# Patient Record
Sex: MALE | Race: WHITE | NOT HISPANIC OR LATINO | Employment: FULL TIME | URBAN - METROPOLITAN AREA
[De-identification: names, ages, dates, MRNs, and addresses within clinical notes are randomized per-mention and may not be internally consistent; named-entity substitution may affect disease eponyms.]

---

## 2017-11-10 ENCOUNTER — OFFICE VISIT (OUTPATIENT)
Dept: SURGERY | Facility: CLINIC | Age: 42
End: 2017-11-10
Payer: COMMERCIAL

## 2017-11-10 VITALS
SYSTOLIC BLOOD PRESSURE: 122 MMHG | BODY MASS INDEX: 22.76 KG/M2 | WEIGHT: 145 LBS | HEIGHT: 67 IN | DIASTOLIC BLOOD PRESSURE: 79 MMHG

## 2017-11-10 DIAGNOSIS — D17.1 LIPOMA OF BACK: Primary | ICD-10-CM

## 2017-11-10 PROCEDURE — 99243 OFF/OP CNSLTJ NEW/EST LOW 30: CPT | Mod: ,,, | Performed by: SURGERY

## 2017-11-10 NOTE — PROGRESS NOTES
Subjective:       Patient ID: Dionisio Devlin is a 41 y.o. male.    Chief Complaint: Other (Referred by  to eval mass on right side of back)      HPI:  Patient presents for evaluation of enlarging mass on his back. Minimal local symptoms only. Patient had a CT scan of the chest which showed that the mass has enlarged compared to previous CTs.    Past Medical History:   Diagnosis Date    Cyst of prostate     Enlarged prostate      History reviewed. No pertinent surgical history.  Review of patient's allergies indicates:  No Known Allergies    Family History   Problem Relation Age of Onset    Lung cancer Mother     Skin cancer Father      Social History     Social History    Marital status:      Spouse name: N/A    Number of children: N/A    Years of education: N/A     Social History Main Topics    Smoking status: Never Smoker    Smokeless tobacco: Never Used    Alcohol use No    Drug use: No    Sexual activity: Not Asked     Other Topics Concern    None     Social History Narrative    None         Review of Systems   Constitutional: Negative for appetite change, chills, fever and unexpected weight change.   HENT: Negative for hearing loss, rhinorrhea, sore throat and voice change.    Eyes: Negative for photophobia and visual disturbance.   Respiratory: Negative for cough, choking and shortness of breath.    Cardiovascular: Negative for chest pain, palpitations and leg swelling.   Gastrointestinal: Negative for abdominal pain, blood in stool, constipation, diarrhea, nausea and vomiting.   Endocrine: Negative for cold intolerance, heat intolerance, polydipsia and polyuria.   Genitourinary: Positive for flank pain.   Musculoskeletal: Negative for arthralgias, back pain, joint swelling and neck stiffness.   Skin: Negative for color change, pallor and rash.   Neurological: Negative for dizziness, seizures, syncope and headaches.   Hematological: Negative for adenopathy. Does not bruise/bleed  easily.   Psychiatric/Behavioral: Negative for agitation, behavioral problems and confusion.       Objective:      Physical Exam   Constitutional: He appears well-developed and well-nourished.  Non-toxic appearance. No distress.   HENT:   Head: Normocephalic and atraumatic. Head is without abrasion and without laceration.   Right Ear: External ear normal.   Left Ear: External ear normal.   Nose: Nose normal.   Mouth/Throat: Oropharynx is clear and moist.   Eyes: EOM are normal. Pupils are equal, round, and reactive to light.   Neck: Trachea normal. No tracheal deviation and normal range of motion present. No thyroid mass and no thyromegaly present.   Cardiovascular: Normal rate and regular rhythm.    Pulmonary/Chest: Effort normal. No accessory muscle usage. No tachypnea. No respiratory distress.   Mobile nontender soft mass on right lateral posterior chest wall. Feels like a lipoma. Feels fairly superficial although the CT describes it located inside latissimus dorsi muscle.   Abdominal: Soft. Normal appearance and bowel sounds are normal. He exhibits no distension and no mass. There is no hepatosplenomegaly. There is no tenderness. There is no tenderness at McBurney's point and negative Fernandez's sign. No hernia.   Lymphadenopathy:     He has no cervical adenopathy.     He has no axillary adenopathy.        Right: No inguinal adenopathy present.        Left: No inguinal adenopathy present.   Neurological: He is alert. Coordination and gait normal.   Skin: Skin is warm and intact.   Psychiatric: He has a normal mood and affect. His speech is normal and behavior is normal.       Assessment/Plan:   Lipoma of back        The mass has been enlarging over the period of years. Recommend removal. Discussed the procedure with patient at length. Patient was think about it and let me know

## 2017-11-10 NOTE — LETTER
November 14, 2017      Benito Villasenor III, MD  [address]  [place] [address]           [facility]  [address]  [address]  [facility] [address]  Phone: [phone]  Fax: [phone]          Patient: Dionisio Devlin   MR Number: [id number]   YOB: 1975   Date of Visit: 11/10/2017       Dear Dr. Benito Villasenor III:    Thank you for referring Dionisio Devlin to me for evaluation. Attached you will find relevant portions of my assessment and plan of care.    If you have questions, please do not hesitate to call me. I look forward to following Dionisio Devlin along with you.    Sincerely,    Serjio MCCLOUD MD    Enclosure  CC:  No Recipients    If you would like to receive this communication electronically, please contact [email or web address] or [phone] to request more information on [employer] access.    For providers and/or their staff who would like to refer a patient to [facility], please contact us through our one-stop-shop provider referral line, [facility], at [phone].    If you feel you have received this communication in error or would no longer like to receive these types of communications, please e-mail [email or web address]

## 2020-06-29 ENCOUNTER — OCCUPATIONAL HEALTH (OUTPATIENT)
Dept: URGENT CARE | Facility: CLINIC | Age: 45
End: 2020-06-29
Payer: COMMERCIAL

## 2020-06-29 DIAGNOSIS — Z02.83 ENCOUNTER FOR DRUG SCREENING: ICD-10-CM

## 2020-06-29 DIAGNOSIS — Z02.1 PRE-EMPLOYMENT EXAMINATION: Primary | ICD-10-CM

## 2020-06-29 LAB — BREATH ALCOHOL: 0

## 2020-06-29 PROCEDURE — 99499 UNLISTED E&M SERVICE: CPT | Mod: S$GLB,,, | Performed by: PHYSICIAN ASSISTANT

## 2020-06-29 PROCEDURE — 82075 POCT BREATH ALCOHOL TEST: ICD-10-PCS | Mod: S$GLB,,, | Performed by: PHYSICIAN ASSISTANT

## 2020-06-29 PROCEDURE — 80305 DRUG TEST PRSMV DIR OPT OBS: CPT | Mod: S$GLB,,, | Performed by: PHYSICIAN ASSISTANT

## 2020-06-29 PROCEDURE — 82075 ASSAY OF BREATH ETHANOL: CPT | Mod: S$GLB,,, | Performed by: PHYSICIAN ASSISTANT

## 2020-06-29 PROCEDURE — 80305 OOH COLLECTION ONLY DRUG SCREEN: ICD-10-PCS | Mod: S$GLB,,, | Performed by: PHYSICIAN ASSISTANT

## 2020-06-29 PROCEDURE — 99499 PHYSICAL, BASIC COMPLEXITY: ICD-10-PCS | Mod: S$GLB,,, | Performed by: PHYSICIAN ASSISTANT

## 2020-07-08 ENCOUNTER — LAB VISIT (OUTPATIENT)
Dept: PRIMARY CARE CLINIC | Facility: OTHER | Age: 45
End: 2020-07-08
Attending: INTERNAL MEDICINE
Payer: OTHER GOVERNMENT

## 2020-07-08 DIAGNOSIS — Z11.59 SPECIAL SCREENING EXAMINATION FOR UNSPECIFIED VIRAL DISEASE: Primary | ICD-10-CM

## 2020-07-08 PROCEDURE — U0003 INFECTIOUS AGENT DETECTION BY NUCLEIC ACID (DNA OR RNA); SEVERE ACUTE RESPIRATORY SYNDROME CORONAVIRUS 2 (SARS-COV-2) (CORONAVIRUS DISEASE [COVID-19]), AMPLIFIED PROBE TECHNIQUE, MAKING USE OF HIGH THROUGHPUT TECHNOLOGIES AS DESCRIBED BY CMS-2020-01-R: HCPCS | Mod: ST72

## 2020-07-12 LAB — SARS-COV-2 RNA RESP QL NAA+PROBE: NEGATIVE

## 2021-06-22 ENCOUNTER — LAB VISIT (OUTPATIENT)
Dept: LAB | Facility: HOSPITAL | Age: 46
End: 2021-06-22
Attending: SPECIALIST
Payer: COMMERCIAL

## 2021-06-22 DIAGNOSIS — Z12.5 SCREENING FOR PROSTATE CANCER: Primary | ICD-10-CM

## 2021-06-22 LAB — COMPLEXED PSA SERPL-MCNC: 0.36 NG/ML (ref 0–4)

## 2021-06-22 PROCEDURE — 84153 ASSAY OF PSA TOTAL: CPT | Performed by: SPECIALIST

## 2021-06-22 PROCEDURE — 36415 COLL VENOUS BLD VENIPUNCTURE: CPT | Performed by: SPECIALIST

## 2022-06-22 ENCOUNTER — LAB VISIT (OUTPATIENT)
Dept: LAB | Facility: HOSPITAL | Age: 47
End: 2022-06-22
Attending: SPECIALIST
Payer: COMMERCIAL

## 2022-06-22 DIAGNOSIS — Z12.5 SPECIAL SCREENING FOR MALIGNANT NEOPLASM OF PROSTATE: Primary | ICD-10-CM

## 2022-06-22 LAB — COMPLEXED PSA SERPL-MCNC: 0.36 NG/ML (ref 0–4)

## 2022-06-22 PROCEDURE — 84153 ASSAY OF PSA TOTAL: CPT | Performed by: SPECIALIST

## 2022-06-22 PROCEDURE — 36415 COLL VENOUS BLD VENIPUNCTURE: CPT | Performed by: SPECIALIST

## 2022-07-18 ENCOUNTER — CLINICAL SUPPORT (OUTPATIENT)
Dept: CARDIOLOGY | Facility: HOSPITAL | Age: 47
End: 2022-07-18
Payer: COMMERCIAL

## 2022-07-18 ENCOUNTER — HOSPITAL ENCOUNTER (OUTPATIENT)
Facility: HOSPITAL | Age: 47
Discharge: HOME | End: 2022-07-20
Attending: EMERGENCY MEDICINE | Admitting: INTERNAL MEDICINE
Payer: COMMERCIAL

## 2022-07-18 VITALS — WEIGHT: 160 LBS | HEIGHT: 66 IN | BODY MASS INDEX: 25.71 KG/M2

## 2022-07-18 DIAGNOSIS — G45.9 TIA (TRANSIENT ISCHEMIC ATTACK): ICD-10-CM

## 2022-07-18 DIAGNOSIS — R20.0 NUMBNESS: ICD-10-CM

## 2022-07-18 DIAGNOSIS — R20.0 NUMBNESS: Primary | ICD-10-CM

## 2022-07-18 PROBLEM — N40.0 BPH (BENIGN PROSTATIC HYPERPLASIA): Status: ACTIVE | Noted: 2022-07-18

## 2022-07-18 LAB
ALBUMIN SERPL BCP-MCNC: 4.5 G/DL (ref 3.5–5.2)
ALP SERPL-CCNC: 56 U/L (ref 55–135)
ALT SERPL W/O P-5'-P-CCNC: 28 U/L (ref 10–44)
ANION GAP SERPL CALC-SCNC: 6 MMOL/L (ref 8–16)
AORTIC ROOT ANNULUS: 2.72 CM
AST SERPL-CCNC: 28 U/L (ref 10–40)
AV INDEX (PROSTH): 0.88
AV MEAN GRADIENT: 2 MMHG
AV VALVE AREA: 2.95 CM2
BASOPHILS # BLD AUTO: 0.03 K/UL (ref 0–0.2)
BASOPHILS NFR BLD: 0.7 % (ref 0–1.9)
BILIRUB SERPL-MCNC: 0.8 MG/DL (ref 0.1–1)
BILIRUB UR QL STRIP: NEGATIVE
BSA FOR ECHO PROCEDURE: 1.84 M2
BUN SERPL-MCNC: 13 MG/DL (ref 6–20)
CALCIUM SERPL-MCNC: 9 MG/DL (ref 8.7–10.5)
CHLORIDE SERPL-SCNC: 101 MMOL/L (ref 95–110)
CHOLEST SERPL-MCNC: 180 MG/DL (ref 120–199)
CHOLEST/HDLC SERPL: 3.2 {RATIO} (ref 2–5)
CLARITY UR: ABNORMAL
CO2 SERPL-SCNC: 30 MMOL/L (ref 23–29)
COLOR UR: YELLOW
CREAT SERPL-MCNC: 0.8 MG/DL (ref 0.5–1.4)
CREAT SERPL-MCNC: 0.9 MG/DL (ref 0.5–1.4)
CV ECHO LV RWT: 0.44 CM
DIFFERENTIAL METHOD: NORMAL
DOP CALC AO VTI: 22.18 CM
DOP CALC LVOT AREA: 3.3 CM2
DOP CALC LVOT DIAMETER: 2.06 CM
DOP CALC LVOT PEAK VEL: 86.83 M/S
DOP CALC LVOT STROKE VOLUME: 65.33 CM3
DOP CALCLVOT PEAK VEL VTI: 19.61 CM
E WAVE DECELERATION TIME: 186.47 MSEC
E/A RATIO: 1.31
E/E' RATIO: 4.74 M/S
ECHO LV POSTERIOR WALL: 0.97 CM (ref 0.6–1.1)
EJECTION FRACTION: 55 %
EOSINOPHIL # BLD AUTO: 0.1 K/UL (ref 0–0.5)
EOSINOPHIL NFR BLD: 2.6 % (ref 0–8)
ERYTHROCYTE [DISTWIDTH] IN BLOOD BY AUTOMATED COUNT: 12.9 % (ref 11.5–14.5)
EST. GFR  (AFRICAN AMERICAN): >60 ML/MIN/1.73 M^2
EST. GFR  (NON AFRICAN AMERICAN): >60 ML/MIN/1.73 M^2
FRACTIONAL SHORTENING: 23 % (ref 28–44)
GLUCOSE SERPL-MCNC: 121 MG/DL (ref 70–110)
GLUCOSE SERPL-MCNC: 136 MG/DL (ref 70–110)
GLUCOSE UR QL STRIP: NEGATIVE
HCT VFR BLD AUTO: 43 % (ref 40–54)
HDLC SERPL-MCNC: 56 MG/DL (ref 40–75)
HDLC SERPL: 31.1 % (ref 20–50)
HGB BLD-MCNC: 14.3 G/DL (ref 14–18)
HGB UR QL STRIP: NEGATIVE
IMM GRANULOCYTES # BLD AUTO: 0 K/UL (ref 0–0.04)
IMM GRANULOCYTES NFR BLD AUTO: 0 % (ref 0–0.5)
INR PPP: 1.1
INTERVENTRICULAR SEPTUM: 0.81 CM (ref 0.6–1.1)
KETONES UR QL STRIP: NEGATIVE
LDLC SERPL CALC-MCNC: 107.6 MG/DL (ref 63–159)
LEFT ATRIUM SIZE: 3.07 CM
LEFT INTERNAL DIMENSION IN SYSTOLE: 3.41 CM (ref 2.1–4)
LEFT VENTRICLE DIASTOLIC VOLUME INDEX: 49.27 ML/M2
LEFT VENTRICLE DIASTOLIC VOLUME: 89.67 ML
LEFT VENTRICLE MASS INDEX: 71 G/M2
LEFT VENTRICLE SYSTOLIC VOLUME INDEX: 16 ML/M2
LEFT VENTRICLE SYSTOLIC VOLUME: 29.2 ML
LEFT VENTRICULAR INTERNAL DIMENSION IN DIASTOLE: 4.45 CM (ref 3.5–6)
LEFT VENTRICULAR MASS: 128.46 G
LEUKOCYTE ESTERASE UR QL STRIP: NEGATIVE
LV LATERAL E/E' RATIO: 4.57 M/S
LV SEPTAL E/E' RATIO: 4.92 M/S
LYMPHOCYTES # BLD AUTO: 1.9 K/UL (ref 1–4.8)
LYMPHOCYTES NFR BLD: 40.3 % (ref 18–48)
MCH RBC QN AUTO: 30.5 PG (ref 27–31)
MCHC RBC AUTO-ENTMCNC: 33.3 G/DL (ref 32–36)
MCV RBC AUTO: 92 FL (ref 82–98)
MONOCYTES # BLD AUTO: 0.5 K/UL (ref 0.3–1)
MONOCYTES NFR BLD: 10.2 % (ref 4–15)
MV PEAK A VEL: 0.49 M/S
MV PEAK E VEL: 0.64 M/S
NEUTROPHILS # BLD AUTO: 2.1 K/UL (ref 1.8–7.7)
NEUTROPHILS NFR BLD: 46.2 % (ref 38–73)
NITRITE UR QL STRIP: NEGATIVE
NONHDLC SERPL-MCNC: 124 MG/DL
NRBC BLD-RTO: 0 /100 WBC
PH UR STRIP: 8 [PH] (ref 5–8)
PISA MRMAX VEL: 547.56 M/S
PISA TR MAX VEL: 1.98 M/S
PLATELET # BLD AUTO: 227 K/UL (ref 150–450)
PMV BLD AUTO: 9.8 FL (ref 9.2–12.9)
POTASSIUM SERPL-SCNC: 3.7 MMOL/L (ref 3.5–5.1)
PROT SERPL-MCNC: 7 G/DL (ref 6–8.4)
PROT UR QL STRIP: NEGATIVE
PROTHROMBIN TIME: 13.8 SEC (ref 11.4–13.7)
RA PRESSURE: 3 MMHG
RBC # BLD AUTO: 4.69 M/UL (ref 4.6–6.2)
SAMPLE: NORMAL
SARS-COV-2 RDRP RESP QL NAA+PROBE: NEGATIVE
SODIUM SERPL-SCNC: 137 MMOL/L (ref 136–145)
SP GR UR STRIP: 1.01 (ref 1–1.03)
TDI LATERAL: 0.14 M/S
TDI SEPTAL: 0.13 M/S
TDI: 0.14 M/S
TR MAX PG: 16 MMHG
TRICUSPID ANNULAR PLANE SYSTOLIC EXCURSION: 2 CM
TRIGL SERPL-MCNC: 82 MG/DL (ref 30–150)
TSH SERPL DL<=0.005 MIU/L-ACNC: 4.49 UIU/ML (ref 0.34–5.6)
TV REST PULMONARY ARTERY PRESSURE: 19 MMHG
URN SPEC COLLECT METH UR: ABNORMAL
UROBILINOGEN UR STRIP-ACNC: NEGATIVE EU/DL
WBC # BLD AUTO: 4.59 K/UL (ref 3.9–12.7)

## 2022-07-18 PROCEDURE — 93306 ECHO (CUPID ONLY): ICD-10-PCS | Mod: 26,,, | Performed by: SPECIALIST

## 2022-07-18 PROCEDURE — 93005 ELECTROCARDIOGRAM TRACING: CPT | Performed by: SPECIALIST

## 2022-07-18 PROCEDURE — 93010 ELECTROCARDIOGRAM REPORT: CPT | Mod: ,,, | Performed by: SPECIALIST

## 2022-07-18 PROCEDURE — 99285 EMERGENCY DEPT VISIT HI MDM: CPT | Mod: 25

## 2022-07-18 PROCEDURE — 93010 EKG 12-LEAD: ICD-10-PCS | Mod: ,,, | Performed by: SPECIALIST

## 2022-07-18 PROCEDURE — 85025 COMPLETE CBC W/AUTO DIFF WBC: CPT | Performed by: EMERGENCY MEDICINE

## 2022-07-18 PROCEDURE — 84443 ASSAY THYROID STIM HORMONE: CPT | Performed by: EMERGENCY MEDICINE

## 2022-07-18 PROCEDURE — 85610 PROTHROMBIN TIME: CPT | Performed by: EMERGENCY MEDICINE

## 2022-07-18 PROCEDURE — 80053 COMPREHEN METABOLIC PANEL: CPT | Performed by: EMERGENCY MEDICINE

## 2022-07-18 PROCEDURE — 82962 GLUCOSE BLOOD TEST: CPT

## 2022-07-18 PROCEDURE — G0378 HOSPITAL OBSERVATION PER HR: HCPCS

## 2022-07-18 PROCEDURE — 81003 URINALYSIS AUTO W/O SCOPE: CPT | Performed by: NURSE PRACTITIONER

## 2022-07-18 PROCEDURE — 93306 TTE W/DOPPLER COMPLETE: CPT | Mod: 26,,, | Performed by: SPECIALIST

## 2022-07-18 PROCEDURE — 93306 TTE W/DOPPLER COMPLETE: CPT

## 2022-07-18 PROCEDURE — 80061 LIPID PANEL: CPT | Performed by: EMERGENCY MEDICINE

## 2022-07-18 PROCEDURE — U0002 COVID-19 LAB TEST NON-CDC: HCPCS | Performed by: EMERGENCY MEDICINE

## 2022-07-18 RX ORDER — MAGNESIUM SULFATE HEPTAHYDRATE 40 MG/ML
4 INJECTION, SOLUTION INTRAVENOUS
Status: DISCONTINUED | OUTPATIENT
Start: 2022-07-18 | End: 2022-07-20 | Stop reason: HOSPADM

## 2022-07-18 RX ORDER — MAGNESIUM SULFATE HEPTAHYDRATE 40 MG/ML
2 INJECTION, SOLUTION INTRAVENOUS
Status: DISCONTINUED | OUTPATIENT
Start: 2022-07-18 | End: 2022-07-20 | Stop reason: HOSPADM

## 2022-07-18 RX ORDER — TAMSULOSIN HYDROCHLORIDE 0.4 MG/1
0.4 CAPSULE ORAL DAILY
Status: DISCONTINUED | OUTPATIENT
Start: 2022-07-18 | End: 2022-07-20 | Stop reason: HOSPADM

## 2022-07-18 RX ORDER — MAGNESIUM SULFATE 1 G/100ML
1 INJECTION INTRAVENOUS
Status: DISCONTINUED | OUTPATIENT
Start: 2022-07-18 | End: 2022-07-20 | Stop reason: HOSPADM

## 2022-07-18 RX ORDER — POTASSIUM CHLORIDE 7.45 MG/ML
40 INJECTION INTRAVENOUS
Status: DISCONTINUED | OUTPATIENT
Start: 2022-07-18 | End: 2022-07-20 | Stop reason: HOSPADM

## 2022-07-18 RX ORDER — ASPIRIN 81 MG/1
81 TABLET ORAL DAILY
Status: DISCONTINUED | OUTPATIENT
Start: 2022-07-18 | End: 2022-07-19

## 2022-07-18 RX ORDER — POTASSIUM CHLORIDE 20 MEQ/1
40 TABLET, EXTENDED RELEASE ORAL
Status: DISCONTINUED | OUTPATIENT
Start: 2022-07-18 | End: 2022-07-20 | Stop reason: HOSPADM

## 2022-07-18 RX ORDER — POTASSIUM CHLORIDE 20 MEQ/1
20 TABLET, EXTENDED RELEASE ORAL
Status: DISCONTINUED | OUTPATIENT
Start: 2022-07-18 | End: 2022-07-20 | Stop reason: HOSPADM

## 2022-07-18 RX ORDER — POTASSIUM CHLORIDE 7.45 MG/ML
20 INJECTION INTRAVENOUS
Status: DISCONTINUED | OUTPATIENT
Start: 2022-07-18 | End: 2022-07-20 | Stop reason: HOSPADM

## 2022-07-18 RX ORDER — LABETALOL HYDROCHLORIDE 5 MG/ML
10 INJECTION, SOLUTION INTRAVENOUS
Status: DISCONTINUED | OUTPATIENT
Start: 2022-07-18 | End: 2022-07-20 | Stop reason: HOSPADM

## 2022-07-18 RX ORDER — TAMSULOSIN HYDROCHLORIDE 0.4 MG/1
0.4 CAPSULE ORAL DAILY
COMMUNITY

## 2022-07-18 RX ORDER — ATORVASTATIN CALCIUM 40 MG/1
40 TABLET, FILM COATED ORAL DAILY
Status: DISCONTINUED | OUTPATIENT
Start: 2022-07-19 | End: 2022-07-19

## 2022-07-18 RX ORDER — SODIUM CHLORIDE 0.9 % (FLUSH) 0.9 %
10 SYRINGE (ML) INJECTION
Status: DISCONTINUED | OUTPATIENT
Start: 2022-07-18 | End: 2022-07-20 | Stop reason: HOSPADM

## 2022-07-18 RX ORDER — ASPIRIN 325 MG
325 TABLET, DELAYED RELEASE (ENTERIC COATED) ORAL DAILY
Status: DISCONTINUED | OUTPATIENT
Start: 2022-07-18 | End: 2022-07-18

## 2022-07-18 RX ORDER — LANOLIN ALCOHOL/MO/W.PET/CERES
800 CREAM (GRAM) TOPICAL
Status: DISCONTINUED | OUTPATIENT
Start: 2022-07-18 | End: 2022-07-20 | Stop reason: HOSPADM

## 2022-07-18 NOTE — HPI
"Mr. Devlin is a 46 year old male with a history of BPH who presents today with complaints of LUE and facial numbness. It is moderate. It is associated with subjective left UE weakness and a "funny taste in my mouth" that has since resolved. He denies vision changes, headache, fever, chills, N/V/D, chest pain, SOB, dizziness, facial asymmetry, word finding difficulties, slurred speech, or LOC. He states he was walking in the shop at work and noticed a funny taste in his mouth and brief episode of left facial and left arm numbness that has resolved on arrival to the ED. He also reports that his wife found out her aunt passed away this morning and the morning was particularly stressful. CT head is negative for acute abnormality.   "

## 2022-07-18 NOTE — ED PROVIDER NOTES
Encounter Date: 7/18/2022       History     Chief Complaint   Patient presents with    Extremity Weakness     LUE numbness and weakness to LUE. About 20 minutes PTA while at work.      Patient here with reported left upper extremity numbness and weakness onset approximately 20 minutes prior to arrival states was at work at the time walking across shop when the symptoms occurred states they are very brief in nature left face and left arm numbness with left arm weakness he denies any lower extremity involvement denies any headache no difficulty speaking he denies any previous symptoms similar to this patient did recently recover from COVID had very uncomplicated course states he has had COVID twice only medical problem is mildly enlarged prostate for which he takes Flomax p.r.n. he does not smoke there is no family history of heart disease or strokes        Review of patient's allergies indicates:  No Known Allergies  Past Medical History:   Diagnosis Date    Cyst of prostate     Enlarged prostate      No past surgical history on file.  Family History   Problem Relation Age of Onset    Lung cancer Mother     Skin cancer Father      Social History     Tobacco Use    Smoking status: Never Smoker    Smokeless tobacco: Never Used   Substance Use Topics    Alcohol use: No    Drug use: No     Review of Systems   Gastrointestinal: Negative for nausea and vomiting.   Neurological: Positive for weakness and numbness. Negative for dizziness, light-headedness and headaches.   All other systems reviewed and are negative.      Physical Exam     Initial Vitals [07/18/22 1120]   BP Pulse Resp Temp SpO2   (!) 150/107 72 18 98.4 °F (36.9 °C) 96 %      MAP       --         Physical Exam    Constitutional: He appears well-developed and well-nourished. No distress.   HENT:   Head: Normocephalic and atraumatic.   Right Ear: External ear normal.   Left Ear: External ear normal.   Mouth/Throat: Oropharynx is clear and moist.    Eyes: EOM are normal. Pupils are equal, round, and reactive to light.   Neck: Neck supple.   Normal range of motion.  Cardiovascular: Normal rate, regular rhythm, S1 normal, S2 normal, normal heart sounds and intact distal pulses.   Pulmonary/Chest: Breath sounds normal.   Abdominal: Abdomen is soft. Bowel sounds are normal. There is no abdominal tenderness.   Musculoskeletal:         General: Normal range of motion.      Cervical back: Normal range of motion and neck supple.     Neurological: He is alert and oriented to person, place, and time. He has normal strength. No cranial nerve deficit or sensory deficit. GCS score is 15. GCS eye subscore is 4. GCS verbal subscore is 5. GCS motor subscore is 6.   Skin: Skin is warm and dry. Capillary refill takes less than 2 seconds. No rash noted.   Psychiatric: He has a normal mood and affect. His behavior is normal.         ED Course   Procedures  Labs Reviewed   COMPREHENSIVE METABOLIC PANEL - Abnormal; Notable for the following components:       Result Value    CO2 30 (*)     Glucose 136 (*)     Anion Gap 6 (*)     All other components within normal limits   PROTIME-INR - Abnormal; Notable for the following components:    PT 13.8 (*)     All other components within normal limits   POCT GLUCOSE - Abnormal; Notable for the following components:    POC Glucose 121 (*)     All other components within normal limits   CBC W/ AUTO DIFFERENTIAL   TSH   SARS-COV-2 RNA AMPLIFICATION, QUAL   LIPID PANEL   ISTAT CREATININE        ECG Results          ECG 12 lead (In process)  Result time 07/18/22 13:08:48    In process by Interface, Lab In [facility] (07/18/22 13:08:48)                 Narrative:    Test Reason : I63.9,    Vent. Rate : 064 BPM     Atrial Rate : 064 BPM     P-R Int : 138 ms          QRS Dur : 096 ms      QT Int : 402 ms       P-R-T Axes : 065 065 060 degrees     QTc Int : 414 ms    Normal sinus rhythm  Septal infarct ,age undetermined  Abnormal ECG  No previous ECGs  available    Referred By: [email or web address]   SELF           Confirmed By:                             Imaging Results          US Carotid Bilateral (Final result)  Result time 07/18/22 16:08:18    Final result by Martinez Apariico MD (07/18/22 16:08:18)                 Narrative:    CMS MANDATED QUALITY DATA - CAROTID - 195    All measurements and percent stenosis described below were determined using NASCET criteria or criteria similar to NASCET, as defined by the Society of Radiologists in Ultrasound Consensus Conference, Radiology, 2003    US CAROTID DOPPLER BILATERAL    CLINICAL HISTORY:  46 years Male left sided numbness    COMPARISON: None    FINDINGS: Grayscale, color Doppler, and spectral Doppler listhesis of the carotid arteries was performed.    Peak systolic velocity in the right CCA is 89.9 cm/sec, and peak systolic velocity in the right ICA is 74.2 cm/sec, with ICA/CCA ratio of less than 2.    Peak systolic velocity in the left CCA is 108.1 cm/sec, and peak systolic velocity in the left ICA is 75.5 cm/sec, with ICA/CCA ratio of less than 2.    The vertebral arteries are patent, with normal waveforms, and normal antegrade flow bilaterally.    IMPRESSION:    1. No Doppler evidence of carotid artery occlusion or hemodynamically significant stenosis.  2. Patent vertebral arteries with antegrade flow bilaterally.    Electronically signed by:  Martinez Aparicio MD  7/18/2022 4:08 PM CDT Workstation: [id number]                             CT Head Without Contrast (Final result)  Result time 07/18/22 12:00:46    Final result by Martinez Aparicio MD (07/18/22 12:00:46)                 Narrative:    CMS MANDATED QUALITY DATA - CT RADIATION  436    All CT scans at this facility utilize dose modulation, iterative reconstruction, and/or weight based dosing when appropriate to reduce radiation dose to as low as reasonably achievable.    CT HEAD WITHOUT IV CONTRAST    CLINICAL HISTORY:  46 years Male Neuro deficit, acute,  stroke suspected    COMPARISON: None    FINDINGS: Negative for acute intracranial hemorrhage, midline shift, or mass effect. Ventricles and sulci are normal in size. Cavum septum pellucidum. Mild bilateral basal ganglia calcifications. Gray-white differentiation is maintained. Cerebellar hemispheres and brainstem are unremarkable.    No calvarial lesion or fracture. Mastoid air cells are clear. Mucosal thickening involving the maxillary sinuses, left greater than right.    IMPRESSION:    No CT evidence of acute intracranial pathology.    Maxillary sinus mucosal thickening.    Electronically signed by:  Martinez Aparicio MD  7/18/2022 12:00 PM CDT Workstation: [id number]                               Medications   aspirin EC tablet 81 mg (has no administration in time range)     Medical Decision Making:   ED Management:  CT scan of the head shows no acute intracranial abnormality there is incidental sinusitis noted laboratory evaluation reviewed discussed patient's findings with the hospitalist who will evaluate patient emergency department for admission                      Clinical Impression:   Final diagnoses:  [R20.0] Numbness  [G45.9] TIA (transient ischemic attack)          ED Disposition Condition    Observation               Chavez Tanner MD  07/18/22 2016

## 2022-07-18 NOTE — SUBJECTIVE & OBJECTIVE
Past Medical History:   Diagnosis Date    Cyst of prostate     Enlarged prostate        No past surgical history on file.    Review of patient's allergies indicates:  No Known Allergies    No current facility-administered medications on file prior to encounter.     Current Outpatient Medications on File Prior to Encounter   Medication Sig    tamsulosin (FLOMAX) 0.4 mg Cap Take 0.4 mg by mouth once daily.     Family History       Problem Relation (Age of Onset)    Lung cancer Mother    Skin cancer Father          Tobacco Use    Smoking status: Never Smoker    Smokeless tobacco: Never Used   Substance and Sexual Activity    Alcohol use: No    Drug use: No    Sexual activity: Not on file     Review of Systems   Constitutional:  Negative for chills, diaphoresis and fever.   HENT:  Negative for congestion, ear pain, sore throat and trouble swallowing.    Eyes:  Negative for pain, discharge and visual disturbance.   Respiratory:  Negative for cough, chest tightness, shortness of breath and wheezing.    Cardiovascular:  Negative for chest pain, palpitations and leg swelling.   Gastrointestinal:  Negative for abdominal distention, abdominal pain, blood in stool, constipation, diarrhea, nausea and vomiting.   Endocrine: Negative for polydipsia, polyphagia and polyuria.   Genitourinary:  Negative for dysuria, flank pain, frequency and urgency.   Musculoskeletal:  Negative for back pain, joint swelling, neck pain and neck stiffness.   Skin:  Negative for rash and wound.   Allergic/Immunologic: Negative for immunocompromised state.   Neurological:  Positive for weakness and numbness. Negative for dizziness, syncope, speech difficulty, light-headedness and headaches.   Hematological:  Negative for adenopathy.   Psychiatric/Behavioral:  Negative for confusion and suicidal ideas. The patient is nervous/anxious.    All other systems reviewed and are negative.  Objective:     Vital Signs (Most Recent):  Temp: 98.4 °F (36.9 °C)  (07/18/22 1120)  Pulse: 65 (07/18/22 1400)  Resp: (!) 21 (07/18/22 1400)  BP: 117/76 (07/18/22 1400)  SpO2: 99 % (07/18/22 1400)   Vital Signs (24h Range):  Temp:  [98.4 °F (36.9 °C)] 98.4 °F (36.9 °C)  Pulse:  [65-74] 65  Resp:  [15-21] 21  SpO2:  [94 %-100 %] 99 %  BP: (117-150)/() 117/76     Weight: 72.6 kg (160 lb)  Body mass index is 25.82 kg/m².    Physical Exam  Vitals and nursing note reviewed.   Constitutional:       Appearance: He is well-developed.   HENT:      Head: Normocephalic and atraumatic.   Eyes:      Conjunctiva/sclera: Conjunctivae normal.      Pupils: Pupils are equal, round, and reactive to light.   Cardiovascular:      Rate and Rhythm: Normal rate and regular rhythm.      Heart sounds: Normal heart sounds.   Pulmonary:      Effort: Pulmonary effort is normal.      Breath sounds: Normal breath sounds.   Abdominal:      General: Bowel sounds are normal.      Palpations: Abdomen is soft.   Musculoskeletal:         General: Normal range of motion.      Cervical back: Normal range of motion and neck supple.   Skin:     General: Skin is warm and dry.      Capillary Refill: Capillary refill takes less than 2 seconds.   Neurological:      Mental Status: He is alert and oriented to person, place, and time.   Psychiatric:         Behavior: Behavior normal.         Thought Content: Thought content normal.         Judgment: Judgment normal.         CRANIAL NERVES     CN III, IV, VI   Pupils are equal, round, and reactive to light.     Significant Labs: All pertinent labs within the past 24 hours have been reviewed.  CBC:   Recent Labs   Lab 07/18/22  1129   WBC 4.59   HGB 14.3   HCT 43.0        CMP:   Recent Labs   Lab 07/18/22  1129      K 3.7      CO2 30*   *   BUN 13   CREATININE 0.8   CALCIUM 9.0   PROT 7.0   ALBUMIN 4.5   BILITOT 0.8   ALKPHOS 56   AST 28   ALT 28   ANIONGAP 6*   EGFRNONAA >60.0       Significant Imaging: I have reviewed all pertinent imaging  results/findings within the past 24 hours.  EKG: I have reviewed all pertinent results/findings within the past 24 hours and my personal findings are: NSR, age indeterminate septal infarction  , no previous EKGs available for comparison    CT Head Without Contrast    Result Date: 7/18/2022  CMS MANDATED QUALITY DATA - CT RADIATION  436 All CT scans at this facility utilize dose modulation, iterative reconstruction, and/or weight based dosing when appropriate to reduce radiation dose to as low as reasonably achievable. CT HEAD WITHOUT IV CONTRAST CLINICAL HISTORY: 46 years Male Neuro deficit, acute, stroke suspected COMPARISON: None FINDINGS: Negative for acute intracranial hemorrhage, midline shift, or mass effect. Ventricles and sulci are normal in size. Cavum septum pellucidum. Mild bilateral basal ganglia calcifications. Gray-white differentiation is maintained. Cerebellar hemispheres and brainstem are unremarkable. No calvarial lesion or fracture. Mastoid air cells are clear. Mucosal thickening involving the maxillary sinuses, left greater than right. IMPRESSION: No CT evidence of acute intracranial pathology. Maxillary sinus mucosal thickening. Electronically signed by:  Martinez Aparicio MD  7/18/2022 12:00 PM CDT Workstation: [id number]

## 2022-07-18 NOTE — H&P
"[facility] Medicine  History & Physical    DOS: 07/18/2022  3:12 PM      Patient Name: Dionisio Devlin  MRN: [id number]  Patient Class: OP- Observation  Admission Date: 7/18/2022  Attending Physician: Dr. Gonzalez  Primary Care Provider: Benito Villasenor III, MD         Patient information was obtained from patient, past medical records and ER records.     Subjective:     Principal Problem:Numbness    Chief Complaint:   Chief Complaint   Patient presents with    Extremity Weakness     LUE numbness and weakness to LUE. About 20 minutes PTA while at work.         HPI: Mr. Devlin is a 46 year old male with a history of BPH who presents today with complaints of LUE and facial numbness. It is moderate. It is associated with subjective left UE weakness and a "funny taste in my mouth" that has since resolved. He denies vision changes, headache, fever, chills, N/V/D, chest pain, SOB, dizziness, facial asymmetry, word finding difficulties, slurred speech, or LOC. He states he was walking in the shop at work and noticed a funny taste in his mouth and brief episode of left facial and left arm numbness that has resolved on arrival to the ED. He also reports that his wife found out her aunt passed away this morning and the morning was particularly stressful. CT head is negative for acute abnormality.       Past Medical History:   Diagnosis Date    Cyst of prostate     Enlarged prostate        No past surgical history on file.    Review of patient's allergies indicates:  No Known Allergies    No current facility-administered medications on file prior to encounter.     Current Outpatient Medications on File Prior to Encounter   Medication Sig    tamsulosin (FLOMAX) 0.4 mg Cap Take 0.4 mg by mouth once daily.     Family History       Problem Relation (Age of Onset)    Lung cancer Mother    Skin cancer Father          Tobacco Use    Smoking status: Never Smoker    Smokeless tobacco: Never Used   Substance " and Sexual Activity    Alcohol use: No    Drug use: No    Sexual activity: Not on file     Review of Systems   Constitutional:  Negative for chills, diaphoresis and fever.   HENT:  Negative for congestion, ear pain, sore throat and trouble swallowing.    Eyes:  Negative for pain, discharge and visual disturbance.   Respiratory:  Negative for cough, chest tightness, shortness of breath and wheezing.    Cardiovascular:  Negative for chest pain, palpitations and leg swelling.   Gastrointestinal:  Negative for abdominal distention, abdominal pain, blood in stool, constipation, diarrhea, nausea and vomiting.   Endocrine: Negative for polydipsia, polyphagia and polyuria.   Genitourinary:  Negative for dysuria, flank pain, frequency and urgency.   Musculoskeletal:  Negative for back pain, joint swelling, neck pain and neck stiffness.   Skin:  Negative for rash and wound.   Allergic/Immunologic: Negative for immunocompromised state.   Neurological:  Positive for weakness and numbness. Negative for dizziness, syncope, speech difficulty, light-headedness and headaches.   Hematological:  Negative for adenopathy.   Psychiatric/Behavioral:  Negative for confusion and suicidal ideas. The patient is nervous/anxious.    All other systems reviewed and are negative.  Objective:     Vital Signs (Most Recent):  Temp: 98.4 °F (36.9 °C) (07/18/22 1120)  Pulse: 65 (07/18/22 1400)  Resp: (!) 21 (07/18/22 1400)  BP: 117/76 (07/18/22 1400)  SpO2: 99 % (07/18/22 1400)   Vital Signs (24h Range):  Temp:  [98.4 °F (36.9 °C)] 98.4 °F (36.9 °C)  Pulse:  [65-74] 65  Resp:  [15-21] 21  SpO2:  [94 %-100 %] 99 %  BP: (117-150)/() 117/76     Weight: 72.6 kg (160 lb)  Body mass index is 25.82 kg/m².    Physical Exam  Vitals and nursing note reviewed.   Constitutional:       Appearance: He is well-developed.   HENT:      Head: Normocephalic and atraumatic.   Eyes:      Conjunctiva/sclera: Conjunctivae normal.      Pupils: Pupils are equal,  round, and reactive to light.   Cardiovascular:      Rate and Rhythm: Normal rate and regular rhythm.      Heart sounds: Normal heart sounds.   Pulmonary:      Effort: Pulmonary effort is normal.      Breath sounds: Normal breath sounds.   Abdominal:      General: Bowel sounds are normal.      Palpations: Abdomen is soft.   Musculoskeletal:         General: Normal range of motion.      Cervical back: Normal range of motion and neck supple.   Skin:     General: Skin is warm and dry.      Capillary Refill: Capillary refill takes less than 2 seconds.   Neurological:      Mental Status: He is alert and oriented to person, place, and time.   Psychiatric:         Behavior: Behavior normal.         Thought Content: Thought content normal.         Judgment: Judgment normal.         CRANIAL NERVES     CN III, IV, VI   Pupils are equal, round, and reactive to light.     Significant Labs: All pertinent labs within the past 24 hours have been reviewed.  CBC:   Recent Labs   Lab 07/18/22  1129   WBC 4.59   HGB 14.3   HCT 43.0        CMP:   Recent Labs   Lab 07/18/22  1129      K 3.7      CO2 30*   *   BUN 13   CREATININE 0.8   CALCIUM 9.0   PROT 7.0   ALBUMIN 4.5   BILITOT 0.8   ALKPHOS 56   AST 28   ALT 28   ANIONGAP 6*   EGFRNONAA >60.0       Significant Imaging: I have reviewed all pertinent imaging results/findings within the past 24 hours.  EKG: I have reviewed all pertinent results/findings within the past 24 hours and my personal findings are: NSR, age indeterminate septal infarction  , no previous EKGs available for comparison    CT Head Without Contrast    Result Date: 7/18/2022  CMS MANDATED QUALITY DATA - CT RADIATION  436 All CT scans at this facility utilize dose modulation, iterative reconstruction, and/or weight based dosing when appropriate to reduce radiation dose to as low as reasonably achievable. CT HEAD WITHOUT IV CONTRAST CLINICAL HISTORY: 46 years Male Neuro deficit, acute, stroke  suspected COMPARISON: None FINDINGS: Negative for acute intracranial hemorrhage, midline shift, or mass effect. Ventricles and sulci are normal in size. Cavum septum pellucidum. Mild bilateral basal ganglia calcifications. Gray-white differentiation is maintained. Cerebellar hemispheres and brainstem are unremarkable. No calvarial lesion or fracture. Mastoid air cells are clear. Mucosal thickening involving the maxillary sinuses, left greater than right. IMPRESSION: No CT evidence of acute intracranial pathology. Maxillary sinus mucosal thickening. Electronically signed by:  Martinez Aparicio MD  7/18/2022 12:00 PM CDT Workstation: [id number]       Assessment/Plan:     * Transient numbness of left face and left arm  Admit to cardiology B  MRI/A brain   Echo with bubble   Continue home meds  Bedside swallow, if passed may have cardiac diet  Permissive HTN for 24 hours with prn labetolol for HR over 220/110  ASA/Statin  NIH/Neuro check per protocol  Fall prxn        BPH (benign prostatic hyperplasia)  Continue flomax        VTE Risk Mitigation (From admission, onward)    None             Fany Georges NP  Department of Hospital Medicine   [facility]

## 2022-07-18 NOTE — ASSESSMENT & PLAN NOTE
Admit to cardiology B  MRI/A brain   Echo with bubble   Continue home meds  Bedside swallow, if passed may have cardiac diet  Permissive HTN for 24 hours with prn labetolol for HR over 220/110  ASA/Statin  NIH/Neuro check per protocol  Fall prxn

## 2022-07-19 LAB
ALBUMIN SERPL BCP-MCNC: 4.1 G/DL (ref 3.5–5.2)
ALP SERPL-CCNC: 56 U/L (ref 55–135)
ALT SERPL W/O P-5'-P-CCNC: 26 U/L (ref 10–44)
ANION GAP SERPL CALC-SCNC: 5 MMOL/L (ref 8–16)
APTT PPP: 34.2 SEC (ref 23.3–35.1)
AST SERPL-CCNC: 27 U/L (ref 10–40)
BASOPHILS # BLD AUTO: 0.03 K/UL (ref 0–0.2)
BASOPHILS NFR BLD: 0.7 % (ref 0–1.9)
BILIRUB SERPL-MCNC: 0.9 MG/DL (ref 0.1–1)
BUN SERPL-MCNC: 11 MG/DL (ref 6–20)
CALCIUM SERPL-MCNC: 9 MG/DL (ref 8.7–10.5)
CHLORIDE SERPL-SCNC: 105 MMOL/L (ref 95–110)
CK MB SERPL-MCNC: 1.2 NG/ML (ref 0.1–6.5)
CO2 SERPL-SCNC: 29 MMOL/L (ref 23–29)
CREAT SERPL-MCNC: 0.8 MG/DL (ref 0.5–1.4)
DIFFERENTIAL METHOD: ABNORMAL
EOSINOPHIL # BLD AUTO: 0.3 K/UL (ref 0–0.5)
EOSINOPHIL NFR BLD: 6.2 % (ref 0–8)
ERYTHROCYTE [DISTWIDTH] IN BLOOD BY AUTOMATED COUNT: 13 % (ref 11.5–14.5)
EST. GFR  (AFRICAN AMERICAN): >60 ML/MIN/1.73 M^2
EST. GFR  (NON AFRICAN AMERICAN): >60 ML/MIN/1.73 M^2
ESTIMATED AVG GLUCOSE: 108 MG/DL (ref 68–131)
GLUCOSE SERPL-MCNC: 98 MG/DL (ref 70–110)
HBA1C MFR BLD: 5.4 % (ref 4.5–6.2)
HCT VFR BLD AUTO: 44.9 % (ref 40–54)
HGB BLD-MCNC: 15 G/DL (ref 14–18)
IMM GRANULOCYTES # BLD AUTO: 0.01 K/UL (ref 0–0.04)
IMM GRANULOCYTES NFR BLD AUTO: 0.2 % (ref 0–0.5)
INR PPP: 1.2
LYMPHOCYTES # BLD AUTO: 1.3 K/UL (ref 1–4.8)
LYMPHOCYTES NFR BLD: 32.7 % (ref 18–48)
MAGNESIUM SERPL-MCNC: 2.3 MG/DL (ref 1.6–2.6)
MCH RBC QN AUTO: 31.4 PG (ref 27–31)
MCHC RBC AUTO-ENTMCNC: 33.4 G/DL (ref 32–36)
MCV RBC AUTO: 94 FL (ref 82–98)
MONOCYTES # BLD AUTO: 0.4 K/UL (ref 0.3–1)
MONOCYTES NFR BLD: 9.7 % (ref 4–15)
NEUTROPHILS # BLD AUTO: 2 K/UL (ref 1.8–7.7)
NEUTROPHILS NFR BLD: 50.5 % (ref 38–73)
NRBC BLD-RTO: 0 /100 WBC
PHOSPHATE SERPL-MCNC: 3.6 MG/DL (ref 2.7–4.5)
PLATELET # BLD AUTO: 217 K/UL (ref 150–450)
PMV BLD AUTO: 10 FL (ref 9.2–12.9)
POTASSIUM SERPL-SCNC: 4.4 MMOL/L (ref 3.5–5.1)
PROT SERPL-MCNC: 6.7 G/DL (ref 6–8.4)
PROTHROMBIN TIME: 14.1 SEC (ref 11.4–13.7)
RBC # BLD AUTO: 4.78 M/UL (ref 4.6–6.2)
SODIUM SERPL-SCNC: 139 MMOL/L (ref 136–145)
TROPONIN I SERPL DL<=0.01 NG/ML-MCNC: <0.03 NG/ML
WBC # BLD AUTO: 4.04 K/UL (ref 3.9–12.7)

## 2022-07-19 PROCEDURE — 25500020 PHARM REV CODE 255: Performed by: INTERNAL MEDICINE

## 2022-07-19 PROCEDURE — 25000003 PHARM REV CODE 250: Performed by: INTERNAL MEDICINE

## 2022-07-19 PROCEDURE — 84100 ASSAY OF PHOSPHORUS: CPT | Performed by: NURSE PRACTITIONER

## 2022-07-19 PROCEDURE — 83735 ASSAY OF MAGNESIUM: CPT | Performed by: NURSE PRACTITIONER

## 2022-07-19 PROCEDURE — 85730 THROMBOPLASTIN TIME PARTIAL: CPT | Performed by: NURSE PRACTITIONER

## 2022-07-19 PROCEDURE — 83036 HEMOGLOBIN GLYCOSYLATED A1C: CPT | Performed by: NURSE PRACTITIONER

## 2022-07-19 PROCEDURE — G0378 HOSPITAL OBSERVATION PER HR: HCPCS

## 2022-07-19 PROCEDURE — A9585 GADOBUTROL INJECTION: HCPCS | Performed by: INTERNAL MEDICINE

## 2022-07-19 PROCEDURE — 82553 CREATINE MB FRACTION: CPT | Performed by: NURSE PRACTITIONER

## 2022-07-19 PROCEDURE — 85610 PROTHROMBIN TIME: CPT | Performed by: NURSE PRACTITIONER

## 2022-07-19 PROCEDURE — 84484 ASSAY OF TROPONIN QUANT: CPT | Performed by: NURSE PRACTITIONER

## 2022-07-19 PROCEDURE — 25000003 PHARM REV CODE 250: Performed by: NURSE PRACTITIONER

## 2022-07-19 PROCEDURE — 36415 COLL VENOUS BLD VENIPUNCTURE: CPT | Performed by: NURSE PRACTITIONER

## 2022-07-19 PROCEDURE — 85025 COMPLETE CBC W/AUTO DIFF WBC: CPT | Performed by: NURSE PRACTITIONER

## 2022-07-19 PROCEDURE — 80053 COMPREHEN METABOLIC PANEL: CPT | Performed by: NURSE PRACTITIONER

## 2022-07-19 RX ORDER — GADOBUTROL 604.72 MG/ML
6.5 INJECTION INTRAVENOUS
Status: COMPLETED | OUTPATIENT
Start: 2022-07-19 | End: 2022-07-19

## 2022-07-19 RX ORDER — MORPHINE SULFATE 2 MG/ML
2 INJECTION, SOLUTION INTRAMUSCULAR; INTRAVENOUS ONCE
Status: DISCONTINUED | OUTPATIENT
Start: 2022-07-19 | End: 2022-07-20 | Stop reason: HOSPADM

## 2022-07-19 RX ORDER — LORAZEPAM 1 MG/1
1 TABLET ORAL ONCE
Status: COMPLETED | OUTPATIENT
Start: 2022-07-19 | End: 2022-07-19

## 2022-07-19 RX ORDER — ACETAMINOPHEN 325 MG/1
650 TABLET ORAL EVERY 6 HOURS PRN
Status: DISCONTINUED | OUTPATIENT
Start: 2022-07-19 | End: 2022-07-20 | Stop reason: HOSPADM

## 2022-07-19 RX ADMIN — ASPIRIN 81 MG: 81 TABLET, DELAYED RELEASE ORAL at 08:07

## 2022-07-19 RX ADMIN — GADOBUTROL 6.5 ML: 604.72 INJECTION INTRAVENOUS at 10:07

## 2022-07-19 RX ADMIN — LORAZEPAM 1 MG: 1 TABLET ORAL at 08:07

## 2022-07-19 RX ADMIN — ACETAMINOPHEN 650 MG: 325 TABLET ORAL at 06:07

## 2022-07-19 RX ADMIN — ATORVASTATIN CALCIUM 40 MG: 40 TABLET, FILM COATED ORAL at 08:07

## 2022-07-19 NOTE — HOSPITAL COURSE
Patient with past medical history of BPH taking intermittent Flomax, none smoker, no past significant family history, presented with transient episode of left upper extremity and facial numbness, lasted less than a few seconds as per patient.  He was admitted for TIA versus CVA workup.  CT head no acute, ultrasound no hemodynamically significant stenosis, , echocardiogram EF of 55% with no shunting.  MRI reported as normal but discussed with Neurology, concern for white matter hyperintensity, right frontal lobe, need to rule out demyelinating disease.  MRI brain with contrast, MRI C-spine and thoracic spine with and without contrast was obtained, no definite lesion/abnormalities, LP was obtained, mild elevation in protein but not diagnostic.  On 07/20, no further focal neurological weakness or numbness, mild intermittent frontal headache, seen by Neurology, cleared for discharge with outpatient follow-up.  Patient feels ready for discharge and appears medically stable for discharge.  Post LP education material provided.  Discharge plan including medications, follow-up as well as return precautions were reviewed, expressed understanding, no questions or concerns.  Discussed with neurology on day of discharge.  Discussed with nursing on day of discharge.    Discharge examination  Sitting in bed, alert, oriented, speech intact, moving all 4 extremities, intact Band-Aid lower lumbar spine

## 2022-07-19 NOTE — PLAN OF CARE
Problem: Adult Inpatient Plan of Care  Goal: Plan of Care Review  Outcome: Ongoing, Progressing  Goal: Patient-Specific Goal (Individualized)  Outcome: Ongoing, Progressing  Goal: Absence of Hospital-Acquired Illness or Injury  Outcome: Ongoing, Progressing  Goal: Optimal Comfort and Wellbeing  Outcome: Ongoing, Progressing  Goal: Readiness for Transition of Care  Outcome: Ongoing, Progressing     Problem: Adjustment to Illness (Stroke, Ischemic/Transient Ischemic Attack)  Goal: Optimal Coping  Outcome: Ongoing, Progressing     Problem: Bowel Elimination Impaired (Stroke, Ischemic/Transient Ischemic Attack)  Goal: Effective Bowel Elimination  Outcome: Ongoing, Progressing     Problem: Cerebral Tissue Perfusion (Stroke, Ischemic/Transient Ischemic Attack)  Goal: Optimal Cerebral Tissue Perfusion  Outcome: Ongoing, Progressing     Problem: Cognitive Impairment (Stroke, Ischemic/Transient Ischemic Attack)  Goal: Optimal Cognitive Function  Outcome: Ongoing, Progressing     Problem: Communication Impairment (Stroke, Ischemic/Transient Ischemic Attack)  Goal: Improved Communication Skills  Outcome: Ongoing, Progressing     Problem: Functional Ability Impaired (Stroke, Ischemic/Transient Ischemic Attack)  Goal: Optimal Functional Ability  Outcome: Ongoing, Progressing     Problem: Respiratory Compromise (Stroke, Ischemic/Transient Ischemic Attack)  Goal: Effective Oxygenation and Ventilation  Outcome: Ongoing, Progressing     Problem: Sensorimotor Impairment (Stroke, Ischemic/Transient Ischemic Attack)  Goal: Improved Sensorimotor Function  Outcome: Ongoing, Progressing     Problem: Swallowing Impairment (Stroke, Ischemic/Transient Ischemic Attack)  Goal: Optimal Eating and Swallowing without Aspiration  Outcome: Ongoing, Progressing     Problem: Urinary Elimination Impaired (Stroke, Ischemic/Transient Ischemic Attack)  Goal: Effective Urinary Elimination  Outcome: Ongoing, Progressing     Problem: Infection  Goal:  Absence of Infection Signs and Symptoms  Outcome: Ongoing, Progressing     Problem: Fall Injury Risk  Goal: Absence of Fall and Fall-Related Injury  Outcome: Ongoing, Progressing

## 2022-07-19 NOTE — PROGRESS NOTES
"[facility] Medicine  Progress Note    Patient Name: Dionisio Devlin  MRN: [id number]  Patient Class: OP- Observation   Admission Date: 7/18/2022  Length of Stay: 0 days  Attending Physician: Laxmi Gonzalez MD  Primary Care Provider: Benito Villasenor III, MD        Subjective:     Principal Problem:Numbness        HPI:  Mr. Dvelin is a 46 year old male with a history of BPH who presents today with complaints of LUE and facial numbness. It is moderate. It is associated with subjective left UE weakness and a "funny taste in my mouth" that has since resolved. He denies vision changes, headache, fever, chills, N/V/D, chest pain, SOB, dizziness, facial asymmetry, word finding difficulties, slurred speech, or LOC. He states he was walking in the shop at work and noticed a funny taste in his mouth and brief episode of left facial and left arm numbness that has resolved on arrival to the ED. He also reports that his wife found out her aunt passed away this morning and the morning was particularly stressful. CT head is negative for acute abnormality.       Overview/Hospital Course:  Patient with past medical history of BPH taking intermittent Flomax, non smoker, no past significant family history, presented with transient episode of left upper extremity and facial numbness, lasted less than a few seconds as per patient.  He was admitted for TIA versus CVA workup.  CT head no acute, ultrasound no hemodynamically significant stenosis, , echocardiogram EF of 55% with no shunting.  MRI reported as normal but discussed with Neurology, concern for white matter hyperintensity, right frontal lobe, need to rule out demyelinating disease.  Further workup ordered including MRI brain with contrast, MRI C-spine and thoracic spine with and without contrast.      Interval History:  See hospital course.  Patient reports transient episode of left upper extremity and face numbness, lasted few seconds and " self-resolved.  Denies any further episodes, no weakness, visual changes, swallowing difficulties, episodes in the past.  Vital stable, .  MRI reported as normal, discussed with Neurology concern for hyper intensity right frontal lobe, need to rule out MS. Discussed with patient, wife, nursing and Neurology.    Review of Systems   Constitutional:  Negative for fever.   HENT:  Negative for trouble swallowing.    Eyes:  Negative for visual disturbance.   Respiratory:  Negative for shortness of breath.    Cardiovascular:  Negative for chest pain.   Genitourinary:  Negative for difficulty urinating.   Neurological:  Negative for weakness and numbness.   Objective:     Vital Signs (Most Recent):  Temp: 98.3 °F (36.8 °C) (07/19/22 1108)  Pulse: 66 (07/19/22 1108)  Resp: 18 (07/19/22 1108)  BP: 112/80 (07/19/22 0707)  SpO2: 100 % (07/19/22 1108) Vital Signs (24h Range):  Temp:  [97.3 °F (36.3 °C)-98.3 °F (36.8 °C)] 98.3 °F (36.8 °C)  Pulse:  [58-66] 66  Resp:  [16-18] 18  SpO2:  [97 %-100 %] 100 %  BP: (110-112)/(71-82) 112/80     Weight: 65.6 kg (144 lb 10 oz)  Body mass index is 23.34 kg/m².    Intake/Output Summary (Last 24 hours) at 7/19/2022 1656  Last data filed at 7/19/2022 1637  Gross per 24 hour   Intake 600 ml   Output 2100 ml   Net -1500 ml      Physical Exam  Vitals and nursing note reviewed.   Constitutional:       General: He is not in acute distress.     Appearance: He is not ill-appearing.      Comments: Sitting in chair   HENT:      Head: Normocephalic and atraumatic.      Mouth/Throat:      Mouth: Mucous membranes are moist.   Cardiovascular:      Rate and Rhythm: Normal rate and regular rhythm.   Pulmonary:      Effort: No respiratory distress.      Breath sounds: No stridor. No wheezing or rhonchi.      Comments: On room air  Abdominal:      General: There is no distension.      Palpations: Abdomen is soft.      Tenderness: There is no abdominal tenderness.   Neurological:      General: No focal  deficit present.      Mental Status: He is alert and oriented to person, place, and time.   Psychiatric:         Mood and Affect: Mood normal.       Significant Labs: BMP:   Recent Labs   Lab 07/19/22  0457   GLU 98      K 4.4      CO2 29   BUN 11   CREATININE 0.8   CALCIUM 9.0   MG 2.3     CBC:   Recent Labs   Lab 07/18/22  1129 07/19/22  0457   WBC 4.59 4.04   HGB 14.3 15.0   HCT 43.0 44.9    217     CMP:   Recent Labs   Lab 07/18/22  1129 07/19/22  0457    139   K 3.7 4.4    105   CO2 30* 29   * 98   BUN 13 11   CREATININE 0.8 0.8   CALCIUM 9.0 9.0   PROT 7.0 6.7   ALBUMIN 4.5 4.1   BILITOT 0.8 0.9   ALKPHOS 56 56   AST 28 27   ALT 28 26   ANIONGAP 6* 5*   EGFRNONAA >60.0 >60.0     Cardiac Markers: No results for input(s): CKMB, MYOGLOBIN, BNP, TROPISTAT in the last 48 hours.  Lactic Acid: No results for input(s): LACTATE in the last 48 hours.  Magnesium:   Recent Labs   Lab 07/19/22  0457   MG 2.3       Significant Imaging: I have reviewed and interpreted all pertinent imaging results/findings within the past 24 hours.      Assessment/Plan:      Active Hospital Problems    Diagnosis    *Transient numbness of left face and left arm    BPH (benign prostatic hyperplasia)     Plan  Continue care on medical floor  MRI brain with contrast, MRI C-spine and lumbar spine with and without contrast  Dose of Ativan prior to MRI study  LP with CSF studies as per Neurology  Discontinue aspirin and Lipitor  Continue Flomax with p.r.n. bladder scan  Continue neuro checks q.4 hours  A.m. labs ordered  Appreciate Neurology input  Further plan as per clinical course    VTE Risk Mitigation (From admission, onward)         Ordered     IP VTE LOW RISK PATIENT  Once         07/18/22 1646     Place sequential compression device  Until discontinued         07/18/22 1646                Discharge Planning   ONIEL: 7/22/2022     Code Status: Full Code   Is the patient medically ready for discharge?:      Reason for patient still in hospital (select all that apply): Imaging  Discharge Plan A: Home, Home with family                  Laxmi Gonzalez MD  Department of Hospital Medicine   [facility]

## 2022-07-19 NOTE — SUBJECTIVE & OBJECTIVE
Interval History:  See hospital course.  Patient reports transient episode of left upper extremity and face numbness, lasted few seconds and self-resolved.  Denies any further episodes, no weakness, visual changes, swallowing difficulties, episodes in the past.  Vital stable, .  MRI reported as normal, discussed with Neurology concern for hyper intensity right frontal lobe, need to rule out MS. Discussed with patient, wife, nursing and Neurology.    Review of Systems   Constitutional:  Negative for fever.   HENT:  Negative for trouble swallowing.    Eyes:  Negative for visual disturbance.   Respiratory:  Negative for shortness of breath.    Cardiovascular:  Negative for chest pain.   Genitourinary:  Negative for difficulty urinating.   Neurological:  Negative for weakness and numbness.   Objective:     Vital Signs (Most Recent):  Temp: 98.3 °F (36.8 °C) (07/19/22 1108)  Pulse: 66 (07/19/22 1108)  Resp: 18 (07/19/22 1108)  BP: 112/80 (07/19/22 0707)  SpO2: 100 % (07/19/22 1108) Vital Signs (24h Range):  Temp:  [97.3 °F (36.3 °C)-98.3 °F (36.8 °C)] 98.3 °F (36.8 °C)  Pulse:  [58-66] 66  Resp:  [16-18] 18  SpO2:  [97 %-100 %] 100 %  BP: (110-112)/(71-82) 112/80     Weight: 65.6 kg (144 lb 10 oz)  Body mass index is 23.34 kg/m².    Intake/Output Summary (Last 24 hours) at 7/19/2022 1656  Last data filed at 7/19/2022 1637  Gross per 24 hour   Intake 600 ml   Output 2100 ml   Net -1500 ml      Physical Exam  Vitals and nursing note reviewed.   Constitutional:       General: He is not in acute distress.     Appearance: He is not ill-appearing.      Comments: Sitting in chair   HENT:      Head: Normocephalic and atraumatic.      Mouth/Throat:      Mouth: Mucous membranes are moist.   Cardiovascular:      Rate and Rhythm: Normal rate and regular rhythm.   Pulmonary:      Effort: No respiratory distress.      Breath sounds: No stridor. No wheezing or rhonchi.      Comments: On room air  Abdominal:      General: There  is no distension.      Palpations: Abdomen is soft.      Tenderness: There is no abdominal tenderness.   Neurological:      General: No focal deficit present.      Mental Status: He is alert and oriented to person, place, and time.   Psychiatric:         Mood and Affect: Mood normal.       Significant Labs: BMP:   Recent Labs   Lab 07/19/22  0457   GLU 98      K 4.4      CO2 29   BUN 11   CREATININE 0.8   CALCIUM 9.0   MG 2.3     CBC:   Recent Labs   Lab 07/18/22 1129 07/19/22 0457   WBC 4.59 4.04   HGB 14.3 15.0   HCT 43.0 44.9    217     CMP:   Recent Labs   Lab 07/18/22 1129 07/19/22  0457    139   K 3.7 4.4    105   CO2 30* 29   * 98   BUN 13 11   CREATININE 0.8 0.8   CALCIUM 9.0 9.0   PROT 7.0 6.7   ALBUMIN 4.5 4.1   BILITOT 0.8 0.9   ALKPHOS 56 56   AST 28 27   ALT 28 26   ANIONGAP 6* 5*   EGFRNONAA >60.0 >60.0     Cardiac Markers: No results for input(s): CKMB, MYOGLOBIN, BNP, TROPISTAT in the last 48 hours.  Lactic Acid: No results for input(s): LACTATE in the last 48 hours.  Magnesium:   Recent Labs   Lab 07/19/22 0457   MG 2.3       Significant Imaging: I have reviewed and interpreted all pertinent imaging results/findings within the past 24 hours.

## 2022-07-19 NOTE — CONSULTS
"[facility]  Neurology Consult    Patient Name: Dionisio Devlin  MRN: [id number]  : 1975  TODAY'S DATE: 2022  ADMIT DATE: 2022 11:19 AM                                          CONSULTED PROVIDER: Annabelle Pike MD, Neurologist. Cellphone: [phone]  CONSULT REQUESTED BY: Laxmi Gonzalez MD    Chief Complaint   Patient presents with    Extremity Weakness     LUE numbness and weakness to LUE. About 20 minutes PTA while at work.        HPI per EMR:  Mr. Devlin is a 46 year old male with a history of BPH who presents today with complaints of LUE and facial numbness. It is moderate. It is associated with subjective left UE weakness and a "funny taste in my mouth" that has since resolved. He denies vision changes, headache, fever, chills, N/V/D, chest pain, SOB, dizziness, facial asymmetry, word finding difficulties, slurred speech, or LOC. He states he was walking in the shop at work and noticed a funny taste in his mouth and brief episode of left facial and left arm numbness that has resolved on arrival to the ED. He also reports that his wife found out her aunt passed away this morning and the morning was particularly stressful. CT head is negative for acute abnormality.     Neurology Consult:  Patient was seen and examined by me today.  He is a 46-year-old man with history of BPH and reported history of hydrocephalus when he was a child who presented with left upper extremity and facial numbness.  He was shopping when the symptoms occurred.  States that he felt 1st the numbness in his left arm followed by numbness in the left side of his face in a weird taste in his mouth.  The symptoms lasted less than 5 minutes and resolved spontaneously without treatment.  He does report increased stress levels as family member passed away recently, but he denies any history of other neuro deficits including weakness, vision loss, blurred vision, double vision, heat intolerance, bowel or " bladder incontinence, MS esmer.  He does not have any risk factors for stroke and has a healthy lifestyle including frequent physical activity and good nutrition.  He has never smoked in his life.    Review of Systems:    A comprehensive ROS was performed and all systems were negative except as noted above in HPI.    Past Medical History:  has a past medical history of Cyst of prostate and Enlarged prostate.    Past Surgical History:  has no past surgical history on file.    Family Medical History: family history includes Lung cancer in his mother; Skin cancer in his father.    Social History:  reports that he has never smoked. He has never used smokeless tobacco. He reports that he does not drink alcohol and does not use drugs.    PCP: Benito Villasenor III, MD    Allergies: Review of patient's allergies indicates:  No Known Allergies    Medications:   No current facility-administered medications on file prior to encounter.     Current Outpatient Medications on File Prior to Encounter   Medication Sig Dispense Refill    tamsulosin (FLOMAX) 0.4 mg Cap Take 0.4 mg by mouth once daily.         Physical Exam  Current Vitals:  Vitals:    07/19/22 1108   BP:    Pulse: 66   Resp: 18   Temp: 98.3 °F (36.8 °C)       Physical Exam:  General: AO x4  HEENT: PERRL, EOMI  CV: RRR  Lungs: CTAB  Abdomen: +BS, S, NT, ND    Neurological Exam    MENTAL STATUS EXAM:  Level of alertness: Alert  Level of attention: Attentive w/out deficit  Orientation/Awareness: intact to person, place, time, situation  Language: fluent. Comprehension/repetition/naming intact    CRANIAL NERVE EXAM:  II/III: fundoscopic exam deferred, PERRL; visual fields full to confrontation; no gross deficit on visual acuity  III/IV/VI: EOMI with horizontal nystagmus. No strabismus, or evoked diplopia  V: no deficits appreciated to pinprick, temp, vibration; masseter strength intact bilaterally  VII: no facial asymmetry noted  VIII: no deficits in hearing  bilaterally  IX/X: palate @ ML and raises symmetrically  XI: shoulder shrug 5/5 bilaterally; head turn 5/5 bilaterally  XII: tongue to midline w/out asymmetry  No dysarthria noted on exam.    MOTOR EXAM:  Bulk and Tone: normal throughout  Strength is 5/5 proximally and distally in upper and lower extremities without deficits.  No pronator drift in bilateral UE. No tremor either at rest or with intention.    REFLEXES:  Masseter (CN V) Not Tested  3+ in bilateral upper and lower extremities, there is bilateral Bella's, pectoral spread and crossed adductors in legs as well as unsustained 2 beat clonus bilaterally and downgoing toes.        SENSORY EXAM  Light touch and vibration are intact throughout in upper and lower extremities without deficits.    COORDINATION/CEREBELLAR EXAM:  FTN: no dysmetria or other signs of appendicular ataxia  HTS: No evidence of appendicular ataxia    GAIT:  Normal, able to tandem, tiptoe walk and heel walk as well.  There is no gait imbalance.    Laboratory Data & Studies    Recent Labs   Lab 07/18/22 1129 07/19/22 0457   WBC 4.59 4.04   HGB 14.3 15.0    217   MCV 92 94       Recent Labs   Lab 07/18/22 1129 07/19/22 0457    139   K 3.7 4.4    105   CO2 30* 29   BUN 13 11   * 98   CALCIUM 9.0 9.0   MG  --  2.3   PHOS  --  3.6       Recent Labs   Lab 07/18/22 1129 07/19/22 0457   PROT 7.0 6.7   ALBUMIN 4.5 4.1   BILITOT 0.8 0.9   AST 28 27   ALT 28 26   ALKPHOS 56 56       Recent Labs   Lab 07/18/22 1129 07/19/22 0457   LABPT 13.8* 14.1*   INR 1.1 1.2   APTT  --  34.2       Recent Labs   Lab 07/18/22 1129 07/19/22 0457   HGBA1C  --  5.4   CHOL 180  --    TRIG 82  --    LDLCALC 107.6  --    HDL 56  --    TSH 4.490  --        Imaging:  CT Head Without Contrast    Result Date: 7/18/2022  CMS MANDATED QUALITY DATA - CT RADIATION  436 All CT scans at this facility utilize dose modulation, iterative reconstruction, and/or weight based dosing when appropriate  to reduce radiation dose to as low as reasonably achievable. CT HEAD WITHOUT IV CONTRAST CLINICAL HISTORY: 46 years Male Neuro deficit, acute, stroke suspected COMPARISON: None FINDINGS: Negative for acute intracranial hemorrhage, midline shift, or mass effect. Ventricles and sulci are normal in size. Cavum septum pellucidum. Mild bilateral basal ganglia calcifications. Gray-white differentiation is maintained. Cerebellar hemispheres and brainstem are unremarkable. No calvarial lesion or fracture. Mastoid air cells are clear. Mucosal thickening involving the maxillary sinuses, left greater than right. IMPRESSION: No CT evidence of acute intracranial pathology. Maxillary sinus mucosal thickening. Electronically signed by:  Martinez Aparicio MD  7/18/2022 12:00 PM CDT Workstation: [id number]    MRA Brain    Result Date: 7/18/2022  HISTORY: Left facial numbness FINDINGS: 3D TOF MRA without contrast was performed, with maximum intensity projections reviewed.The internal carotid and basilar artery and distal right vertebral artery are patent, without evidence of aneurysm, occlusion or significant stenosis. The anterior, middle and posterior cerebral arteries demonstrate no aneurysm, stenosis or vascular malformation. There is nonvisualization of the distal left vertebral artery which may represent proximal stenosis IMPRESSION: Normal MRA of the brain Nonvisualization of the distal left vertebral artery which may be secondary to proximal stenosis. CT of the neck is recommended for further evaluation Electronically signed by:  Marisa Darling MD  7/18/2022 6:41 PM CDT Workstation: [id number]    MRI BRAIN WITHOUT CONTRAST    Result Date: 7/18/2022  MRI of the brain is compared to a prior CT at 11:43 AM Clinical history is extremity weakness Ventricles and sulci are normal in size and configuration for age. There is no hemorrhage, mass or midline shift. There are no extra-axial fluid collections. There is normal signal  within the white matter on all sequences. There is no abnormality on the diffusion-weighted images to suggest acute infarct. The cerebellum and brainstem are normal. There is a patent cavum septum pellucidum. There are flow voids within the cavernous portions of the internal carotid arteries and basilar arteries indicating patency. The corpus callosum, cerebellar tonsils of midline structures are normal. There is mucosal thickening in the right maxillary sinus. The remainder the paranasal sinuses and mastoid air cells are clear. IMPRESSION: No acute intracranial process Electronically signed by:  Marisa Darling MD  7/18/2022 6:38 PM CDT Workstation: [id number]    US Carotid Bilateral    Result Date: 7/18/2022  CMS MANDATED QUALITY DATA - CAROTID - 195 All measurements and percent stenosis described below were determined using NASCET criteria or criteria similar to NASCET, as defined by the Society of Radiologists in Ultrasound Consensus Conference, Radiology, 2003 US CAROTID DOPPLER BILATERAL CLINICAL HISTORY: 46 years Male left sided numbness COMPARISON: None FINDINGS: Grayscale, color Doppler, and spectral Doppler listhesis of the carotid arteries was performed. Peak systolic velocity in the right CCA is 89.9 cm/sec, and peak systolic velocity in the right ICA is 74.2 cm/sec, with ICA/CCA ratio of less than 2. Peak systolic velocity in the left CCA is 108.1 cm/sec, and peak systolic velocity in the left ICA is 75.5 cm/sec, with ICA/CCA ratio of less than 2. The vertebral arteries are patent, with normal waveforms, and normal antegrade flow bilaterally. IMPRESSION: 1. No Doppler evidence of carotid artery occlusion or hemodynamically significant stenosis. 2. Patent vertebral arteries with antegrade flow bilaterally. Electronically signed by:  Martinez Aparicio MD  7/18/2022 4:08 PM CDT Workstation: [id number]    Echo Saline Bubble? Yes    Result Date: 7/18/2022  · The left ventricle is normal in size with  concentric remodeling and normal systolic function. · There is no evidence of intracardiac shunting. Bubble study negative for PFO · The estimated ejection fraction is 55%. · Normal left ventricular diastolic function. · Atrial fibrillation not observed. · Normal right ventricular size with normal right ventricular systolic function. · Normal central venous pressure (3 mmHg). · The estimated PA systolic pressure is 19 mmHg.          Assessment and Plan:    Left sided weakness  Abnormal MRI - bilateral T2 hyperintensities  Multiple sclerosis rule out  Hyperreflexia    46-year-old man with history of BPH and reported history of hydrocephalus when he was a child who presented with sudden onset left upper extremity and facial numbness that lasted less than 5 minutes and resolved spontaneously.  MRI brain was negative for stroke, but did show several bilateral T2 hyperintensities concerning for demyelinating disease (such as MS or NMOSD) vs microvascular disease. Neuro exam is non focal, but he does have hyperreflexia in upper and lower extremities. Favoring demyelination due to patient's age and lack of stroke risk factors. Will order comprehensive workup.  - Admitted to hospital medicine on the floor, with Q4H neurochecks and telemetry   - Ordered MRI brain with contrast to assess for ANA enhancement  - Ordered MRI Cervical and thoracic spine with and wo contrast to assess for demyelination  - Ordered IR guided lumbar puncture. CSF studies ordered: cell count and diff, protein, glucose, MS profile (blood and CSF), aquaporin 4 Abs (NMOSD). Other tests to be ordered after results of basic studies  - Further recommendations to be made upon results of above tests  - Will follow along      · DVT prophylaxis with chemo/SCD prophylaxis  · Extensively discussed lifestyle modifications as prophylactic measures for stroke prevention including smoking cessation, adequate blood pressure management, healthy diet and regular  exercise.    Patient to [facility] at [phone] within 3 days from discharge.     Stroke education was provided including stroke risk factors modification and any acute neurological changes including weakness, confusion, visual changes to come straight to the ER.     All questions were answered.                              Thank you kindly for including us in the care of this patient. Please do not hesitate to contact us with any questions.      65 minutes of care time has been spent evaluating with the patient. Time includes chart review not limited to diagnostic imaging, labs, and vitals, patient assessment, discussion with family and nursing, current order evaluations, and new order entries.      Annabelle Pike MD  Neurology

## 2022-07-19 NOTE — PLAN OF CARE
[facility]  Initial Discharge Assessment       Primary Care Provider: Benito Villasenor III, MD    Admission Diagnosis: Numbness [R20.0]    Admission Date: 7/18/2022  Expected Discharge Date:     Discharge Barriers Identified: (P) None    Payor: BLUE CROSS BLUE SHIELD / Plan: BCBS ALL OUT OF STATE / Product Type: PPO /     Extended Emergency Contact Information  Primary Emergency Contact: Kasey Devlin  Address: [address] [place], [place] [address] [place] [address]  Home Phone: [phone]  Mobile Phone: [phone]  Relation: Spouse    Discharge Plan A: (P) Home, Home with family  Discharge Plan B: (P) Home, Home with family      [id number] [place] [address] AT [facility] [address]  [address]  [facility] [address]  Phone: [phone] Fax: [phone]    [id number] - [place] [address] AT [facility] & [address]  [address] [facility] [address]  Phone: [phone] Fax: [phone]      Initial Assessment (most recent)       Adult Discharge Assessment - 07/19/22 1102          Discharge Assessment    Assessment Type Discharge Planning Assessment (P)      Confirmed/corrected address, phone number and insurance Yes (P)      Confirmed Demographics Correct on Facesheet (P)      Source of Information patient (P)      When was your last doctors appointment? -- (P)    last month    Communicated ONIEL with patient/caregiver No (P)      Reason For Admission tansient numbness of face (P)      Lives With spouse (P)      Facility Arrived From: home (P)      Do you expect to return to your current living situation? Yes (P)      Do you have help at home or someone to help you manage your care at home? Yes (P)      Who are your caregiver(s) and their phone number(s)? Kasey Devlin (Spouse)   [phone] (Mobile) (P)      Prior to hospitilization cognitive status: Alert/Oriented (P)      Current cognitive status:  Alert/Oriented (P)      Walking or Climbing Stairs Difficulty none (P)      Dressing/Bathing Difficulty none (P)      Equipment Currently Used at Home none (P)      Readmission within 30 days? No (P)      Patient currently being followed by outpatient case management? No (P)      Do you currently have service(s) that help you manage your care at home? No (P)      Do you take prescription medications? Yes (P)      Do you have prescription coverage? Yes (P)      Coverage BCBS (P)      Do you have any problems affording any of your prescribed medications? No (P)      Is the patient taking medications as prescribed? yes (P)      Who is going to help you get home at discharge? Kasey Devlin (Spouse)   [phone] (Mobile) (P)      How do you get to doctors appointments? car, drives self (P)      Are you on dialysis? No (P)      Do you take coumadin? No (P)      Discharge Plan A Home;Home with family (P)      Discharge Plan B Home;Home with family (P)      DME Needed Upon Discharge  none (P)      Discharge Plan discussed with: Patient (P)      Discharge Barriers Identified None (P)         Relationship/Environment    Name(s) of Who Lives With Patient Kasey Devlin (Spouse)   [phone] (Mobile) (P)

## 2022-07-19 NOTE — NURSING
Educated the patient on the reasoning for the bed alarm is to prevent falls from occurring and for his safety. Patient refused bed alarm and verbalized understanding. Reeducated the patient and bed alarm was refused.     -Juan Jose Medina RN

## 2022-07-20 VITALS
SYSTOLIC BLOOD PRESSURE: 142 MMHG | BODY MASS INDEX: 23.09 KG/M2 | HEART RATE: 78 BPM | TEMPERATURE: 98 F | RESPIRATION RATE: 18 BRPM | OXYGEN SATURATION: 98 % | DIASTOLIC BLOOD PRESSURE: 76 MMHG | HEIGHT: 66 IN | WEIGHT: 143.69 LBS

## 2022-07-20 LAB
ALBUMIN SERPL BCP-MCNC: 4.2 G/DL (ref 3.5–5.2)
ALP SERPL-CCNC: 57 U/L (ref 55–135)
ALT SERPL W/O P-5'-P-CCNC: 28 U/L (ref 10–44)
ANION GAP SERPL CALC-SCNC: 7 MMOL/L (ref 8–16)
AST SERPL-CCNC: 26 U/L (ref 10–40)
BASOPHILS # BLD AUTO: 0.02 K/UL (ref 0–0.2)
BASOPHILS NFR BLD: 0.3 % (ref 0–1.9)
BILIRUB SERPL-MCNC: 1 MG/DL (ref 0.1–1)
BUN SERPL-MCNC: 14 MG/DL (ref 6–20)
CALCIUM SERPL-MCNC: 8.7 MG/DL (ref 8.7–10.5)
CHLORIDE SERPL-SCNC: 99 MMOL/L (ref 95–110)
CLARITY CSF: CLEAR
CO2 SERPL-SCNC: 27 MMOL/L (ref 23–29)
COLOR CSF: COLORLESS
CREAT SERPL-MCNC: 0.9 MG/DL (ref 0.5–1.4)
DIFFERENTIAL METHOD: ABNORMAL
EOSINOPHIL # BLD AUTO: 0.3 K/UL (ref 0–0.5)
EOSINOPHIL NFR BLD: 5.3 % (ref 0–8)
ERYTHROCYTE [DISTWIDTH] IN BLOOD BY AUTOMATED COUNT: 12.9 % (ref 11.5–14.5)
EST. GFR  (AFRICAN AMERICAN): >60 ML/MIN/1.73 M^2
EST. GFR  (NON AFRICAN AMERICAN): >60 ML/MIN/1.73 M^2
GLUCOSE CSF-MCNC: 61 MG/DL (ref 40–70)
GLUCOSE SERPL-MCNC: 91 MG/DL (ref 70–110)
HCT VFR BLD AUTO: 44.4 % (ref 40–54)
HGB BLD-MCNC: 15 G/DL (ref 14–18)
IMM GRANULOCYTES # BLD AUTO: 0.01 K/UL (ref 0–0.04)
IMM GRANULOCYTES NFR BLD AUTO: 0.2 % (ref 0–0.5)
LYMPHOCYTES # BLD AUTO: 1.5 K/UL (ref 1–4.8)
LYMPHOCYTES NFR BLD: 24.9 % (ref 18–48)
LYMPHOCYTES NFR CSF MANUAL: 100 % (ref 40–80)
MCH RBC QN AUTO: 31.3 PG (ref 27–31)
MCHC RBC AUTO-ENTMCNC: 33.8 G/DL (ref 32–36)
MCV RBC AUTO: 93 FL (ref 82–98)
MISCELLANEOUS TEST NAME: NORMAL
MONOCYTES # BLD AUTO: 0.6 K/UL (ref 0.3–1)
MONOCYTES NFR BLD: 9.8 % (ref 4–15)
NEUTROPHILS # BLD AUTO: 3.5 K/UL (ref 1.8–7.7)
NEUTROPHILS NFR BLD: 59.5 % (ref 38–73)
NRBC BLD-RTO: 0 /100 WBC
PLATELET # BLD AUTO: 218 K/UL (ref 150–450)
PMV BLD AUTO: 9.9 FL (ref 9.2–12.9)
POTASSIUM SERPL-SCNC: 3.9 MMOL/L (ref 3.5–5.1)
PROT CSF-MCNC: 42 MG/DL (ref 15–40)
PROT SERPL-MCNC: 6.5 G/DL (ref 6–8.4)
RBC # BLD AUTO: 4.8 M/UL (ref 4.6–6.2)
RBC # CSF: 0 /CU MM
SODIUM SERPL-SCNC: 133 MMOL/L (ref 136–145)
SPECIMEN VOL CSF: 1 ML
WBC # BLD AUTO: 5.82 K/UL (ref 3.9–12.7)
WBC # CSF: 1 /CU MM (ref 0–5)

## 2022-07-20 PROCEDURE — 89051 BODY FLUID CELL COUNT: CPT | Performed by: STUDENT IN AN ORGANIZED HEALTH CARE EDUCATION/TRAINING PROGRAM

## 2022-07-20 PROCEDURE — 85025 COMPLETE CBC W/AUTO DIFF WBC: CPT | Performed by: NURSE PRACTITIONER

## 2022-07-20 PROCEDURE — 82164 ANGIOTENSIN I ENZYME TEST: CPT | Performed by: STUDENT IN AN ORGANIZED HEALTH CARE EDUCATION/TRAINING PROGRAM

## 2022-07-20 PROCEDURE — 83916 OLIGOCLONAL BANDS: CPT

## 2022-07-20 PROCEDURE — 86052 AQUAPORIN-4 ANTB CBA EACH: CPT

## 2022-07-20 PROCEDURE — 25000003 PHARM REV CODE 250: Performed by: INTERNAL MEDICINE

## 2022-07-20 PROCEDURE — 36415 COLL VENOUS BLD VENIPUNCTURE: CPT | Performed by: STUDENT IN AN ORGANIZED HEALTH CARE EDUCATION/TRAINING PROGRAM

## 2022-07-20 PROCEDURE — 30000890 LABCORP MISCELLANEOUS TEST: Mod: 91 | Performed by: STUDENT IN AN ORGANIZED HEALTH CARE EDUCATION/TRAINING PROGRAM

## 2022-07-20 PROCEDURE — 82784 ASSAY IGA/IGD/IGG/IGM EACH: CPT

## 2022-07-20 PROCEDURE — 82042 OTHER SOURCE ALBUMIN QUAN EA: CPT

## 2022-07-20 PROCEDURE — 84157 ASSAY OF PROTEIN OTHER: CPT | Performed by: STUDENT IN AN ORGANIZED HEALTH CARE EDUCATION/TRAINING PROGRAM

## 2022-07-20 PROCEDURE — 30000890 HC MISC. SEND OUT TEST

## 2022-07-20 PROCEDURE — G0378 HOSPITAL OBSERVATION PER HR: HCPCS

## 2022-07-20 PROCEDURE — 82945 GLUCOSE OTHER FLUID: CPT | Performed by: STUDENT IN AN ORGANIZED HEALTH CARE EDUCATION/TRAINING PROGRAM

## 2022-07-20 PROCEDURE — 96360 HYDRATION IV INFUSION INIT: CPT | Mod: 59

## 2022-07-20 PROCEDURE — 80053 COMPREHEN METABOLIC PANEL: CPT | Performed by: NURSE PRACTITIONER

## 2022-07-20 PROCEDURE — 96361 HYDRATE IV INFUSION ADD-ON: CPT | Mod: 59

## 2022-07-20 PROCEDURE — 82040 ASSAY OF SERUM ALBUMIN: CPT | Mod: 91 | Performed by: STUDENT IN AN ORGANIZED HEALTH CARE EDUCATION/TRAINING PROGRAM

## 2022-07-20 RX ORDER — SODIUM CHLORIDE 9 MG/ML
INJECTION, SOLUTION INTRAVENOUS CONTINUOUS
Status: DISCONTINUED | OUTPATIENT
Start: 2022-07-20 | End: 2022-07-20

## 2022-07-20 RX ADMIN — SODIUM CHLORIDE: 0.9 INJECTION, SOLUTION INTRAVENOUS at 11:07

## 2022-07-20 NOTE — PLAN OF CARE
Problem: Adult Inpatient Plan of Care  Goal: Plan of Care Review  Outcome: Met  Goal: Patient-Specific Goal (Individualized)  Outcome: Met  Goal: Absence of Hospital-Acquired Illness or Injury  Outcome: Met  Goal: Optimal Comfort and Wellbeing  Outcome: Met  Goal: Readiness for Transition of Care  Outcome: Met     Problem: Adjustment to Illness (Stroke, Ischemic/Transient Ischemic Attack)  Goal: Optimal Coping  Outcome: Met     Problem: Bowel Elimination Impaired (Stroke, Ischemic/Transient Ischemic Attack)  Goal: Effective Bowel Elimination  Outcome: Met     Problem: Cerebral Tissue Perfusion (Stroke, Ischemic/Transient Ischemic Attack)  Goal: Optimal Cerebral Tissue Perfusion  Outcome: Met     Problem: Cognitive Impairment (Stroke, Ischemic/Transient Ischemic Attack)  Goal: Optimal Cognitive Function  Outcome: Met     Problem: Communication Impairment (Stroke, Ischemic/Transient Ischemic Attack)  Goal: Improved Communication Skills  Outcome: Met     Problem: Functional Ability Impaired (Stroke, Ischemic/Transient Ischemic Attack)  Goal: Optimal Functional Ability  Outcome: Met     Problem: Respiratory Compromise (Stroke, Ischemic/Transient Ischemic Attack)  Goal: Effective Oxygenation and Ventilation  Outcome: Met     Problem: Sensorimotor Impairment (Stroke, Ischemic/Transient Ischemic Attack)  Goal: Improved Sensorimotor Function  Outcome: Met     Problem: Swallowing Impairment (Stroke, Ischemic/Transient Ischemic Attack)  Goal: Optimal Eating and Swallowing without Aspiration  Outcome: Met     Problem: Urinary Elimination Impaired (Stroke, Ischemic/Transient Ischemic Attack)  Goal: Effective Urinary Elimination  Outcome: Met     Problem: Infection  Goal: Absence of Infection Signs and Symptoms  Outcome: Met     Problem: Fall Injury Risk  Goal: Absence of Fall and Fall-Related Injury  Outcome: Met     Problem: Impaired Wound Healing  Goal: Optimal Wound Healing  Outcome: Met

## 2022-07-20 NOTE — NURSING
Educated the patient on the reasoning for the bed alarm is to prevent falls from occurring and for his safety. Patient refused bed alarm and verbalized understanding. Re educated the patient and bed alarm was refused. Patient AAOx4 and ambulated with nurse in room.      -Juan Jose Medina RN

## 2022-07-20 NOTE — DISCHARGE SUMMARY
"[facility]  Discharge Summary      Patient Name: Dionisio Devlin  MRN: [id number]  Patient Class: OP- Observation  Admission Date: 7/18/2022  Hospital Length of Stay: 0 days  Discharge Date and Time: 7/20/2022  3:50 PM  Attending Physician: No att. providers found   Discharging Provider: Laxmi Gonzalez MD  Primary Care Provider: Benito Villasenor III, MD      HPI:   Mr. Devlin is a 46 year old male with a history of BPH who presents today with complaints of LUE and facial numbness. It is moderate. It is associated with subjective left UE weakness and a "funny taste in my mouth" that has since resolved. He denies vision changes, headache, fever, chills, N/V/D, chest pain, SOB, dizziness, facial asymmetry, word finding difficulties, slurred speech, or LOC. He states he was walking in the shop at work and noticed a funny taste in his mouth and brief episode of left facial and left arm numbness that has resolved on arrival to the ED. He also reports that his wife found out her aunt passed away this morning and the morning was particularly stressful. CT head is negative for acute abnormality.       * No surgery found *      Hospital Course:   Patient with past medical history of BPH taking intermittent Flomax, none smoker, no past significant family history, presented with transient episode of left upper extremity and facial numbness, lasted less than a few seconds as per patient.  He was admitted for TIA versus CVA workup.  CT head no acute, ultrasound no hemodynamically significant stenosis, , echocardiogram EF of 55% with no shunting.  MRI reported as normal but discussed with Neurology, concern for white matter hyperintensity, right frontal lobe, need to rule out demyelinating disease.  MRI brain with contrast, MRI C-spine and thoracic spine with and without contrast was obtained, no definite lesion/abnormalities, LP was obtained, mild elevation in protein but not diagnostic.  On " 07/20, no further focal neurological weakness or numbness, mild intermittent frontal headache, seen by Neurology, cleared for discharge with outpatient follow-up.  Patient feels ready for discharge and appears medically stable for discharge.  Post LP education material provided.  Discharge plan including medications, follow-up as well as return precautions were reviewed, expressed understanding, no questions or concerns.  Discussed with neurology on day of discharge.  Discussed with nursing on day of discharge.    Discharge examination  Sitting in bed, alert, oriented, speech intact, moving all 4 extremities, intact Band-Aid lower lumbar spine       Goals of Care Treatment Preferences:  Code Status: Full Code      Consults:   Consults (From admission, onward)        Status Ordering Provider     IP consult to case management/social work  Once        Provider:  (Not yet assigned)    Acknowledged FRAN BAEZ     Inpatient consult to Neurology  Once        Provider:  Annabelle Medrano MD    Completed ORLANDO CIFUENTES          No new Assessment & Plan notes have been filed under this hospital service since the last note was generated.  Service: Hospital Medicine    Final Active Diagnoses:    Diagnosis Date Noted POA    PRINCIPAL PROBLEM:  Transient numbness of left face and left arm [R20.0] 07/18/2022 Yes    BPH (benign prostatic hyperplasia) [N40.0] 07/18/2022 Yes      Problems Resolved During this Admission:       Discharged Condition: good    Disposition: Home or Self Care    Follow Up:   Follow-up Information     Annabelle Pike MD. Schedule an appointment as soon as possible for a visit in 2 week(s).    Specialty: Neurology  Why: follow up  Contact information:  [address] [place] [id number]  [phone]             Benito Villasenor III, MD. Go in 1 week(s).    Specialty: Family Medicine  Why: Post hospital follow up appointment scheduled for Monday 8/14/2022 at 10:00am with  Dr. Villasenor.  Contact information:  [email or web address] [facility]  [address] [facility]  [place] [address]  [phone]                       Patient Instructions:      Diet Adult Regular     Notify your health care provider if you experience any of the following:  persistent dizziness, light-headedness, or visual disturbances     Notify your health care provider if you experience any of the following:  increased confusion or weakness     Notify your health care provider if you experience any of the following:  severe persistent headache     Notify your health care provider if you experience any of the following:  temperature >100.4     Activity as tolerated       Significant Diagnostic Studies: Labs:   BMP:   Recent Labs   Lab 07/19/22 0457 07/20/22  0502   GLU 98 91    133*   K 4.4 3.9    99   CO2 29 27   BUN 11 14   CREATININE 0.8 0.9   CALCIUM 9.0 8.7   MG 2.3  --    , CMP   Recent Labs   Lab 07/19/22  0457 07/20/22  0502    133*   K 4.4 3.9    99   CO2 29 27   GLU 98 91   BUN 11 14   CREATININE 0.8 0.9   CALCIUM 9.0 8.7   PROT 6.7 6.5   ALBUMIN 4.1 4.2   BILITOT 0.9 1.0   ALKPHOS 56 57   AST 27 26   ALT 26 28   ANIONGAP 5* 7*   ESTGFRAFRICA >60.0 >60.0   EGFRNONAA >60.0 >60.0   , CBC   Recent Labs   Lab 07/19/22 0457 07/20/22  0502   WBC 4.04 5.82   HGB 15.0 15.0   HCT 44.9 44.4    218   , INR   Lab Results   Component Value Date    INR 1.2 07/19/2022    INR 1.1 07/18/2022   , Lipid Panel   Lab Results   Component Value Date    CHOL 180 07/18/2022    HDL 56 07/18/2022    LDLCALC 107.6 07/18/2022    TRIG 82 07/18/2022    CHOLHDL 31.1 07/18/2022   , Troponin   Recent Labs   Lab 07/19/22 0457   TROPONINI <0.030    and A1C:   Recent Labs   Lab 07/19/22 0457   HGBA1C 5.4     CT Head Without Contrast    Result Date: 7/18/2022  CMS MANDATED QUALITY DATA - CT RADIATION  436 All CT scans at this facility utilize dose modulation, iterative reconstruction, and/or weight based dosing when appropriate to reduce  radiation dose to as low as reasonably achievable. CT HEAD WITHOUT IV CONTRAST CLINICAL HISTORY: 46 years Male Neuro deficit, acute, stroke suspected COMPARISON: None FINDINGS: Negative for acute intracranial hemorrhage, midline shift, or mass effect. Ventricles and sulci are normal in size. Cavum septum pellucidum. Mild bilateral basal ganglia calcifications. Gray-white differentiation is maintained. Cerebellar hemispheres and brainstem are unremarkable. No calvarial lesion or fracture. Mastoid air cells are clear. Mucosal thickening involving the maxillary sinuses, left greater than right. IMPRESSION: No CT evidence of acute intracranial pathology. Maxillary sinus mucosal thickening. Electronically signed by:  Martinez Aparicio MD  7/18/2022 12:00 PM CDT Workstation: [id number]    MRA Brain    Result Date: 7/18/2022  HISTORY: Left facial numbness FINDINGS: 3D TOF MRA without contrast was performed, with maximum intensity projections reviewed.The internal carotid and basilar artery and distal right vertebral artery are patent, without evidence of aneurysm, occlusion or significant stenosis. The anterior, middle and posterior cerebral arteries demonstrate no aneurysm, stenosis or vascular malformation. There is nonvisualization of the distal left vertebral artery which may represent proximal stenosis IMPRESSION: Normal MRA of the brain Nonvisualization of the distal left vertebral artery which may be secondary to proximal stenosis. CT of the neck is recommended for further evaluation Electronically signed by:  Marisa Darling MD  7/18/2022 6:41 PM CDT Workstation: [id number]    MRI BRAIN WITHOUT CONTRAST    Addendum Date: 7/20/2022    This is a correction to the prior dictation. There are mild foci of increased FLAIR and T2 signal within the subcortical white matter of the frontal lobes and left parietal lobe suggestive of small vessel disease.  Demyelinating process cannot be excluded. Electronically signed  by: Marisa Darling MD Date:    07/20/2022 Time:    08:46    Result Date: 7/20/2022   ADDENDUM #1 Case with discussed with Dr. Murillo at the time of this addendum. There are abnormal foci of increased T2/FLAIR signal involving subcortical white matter of both cerebral hemispheres. This is most pronounced involving the right frontal lobe juxtacortical region (FLAIR images 19 through 21), as well as left parietal region (FLAIR image 16). These findings are nonspecific but could be seen in the setting of demyelination, vasculitis, or chronic small vessel ischemia. Electronically signed by:  Martinez Aparicio MD  7/19/2022 4:51 PM CDT Workstation: [id number]  ORIGINAL REPORT MRI of the brain is compared to a prior CT at 11:43 AM Clinical history is extremity weakness Ventricles and sulci are normal in size and configuration for age. There is no hemorrhage, mass or midline shift. There are no extra-axial fluid collections. There is normal signal within the white matter on all sequences. There is no abnormality on the diffusion-weighted images to suggest acute infarct. The cerebellum and brainstem are normal. There is a patent cavum septum pellucidum. There are flow voids within the cavernous portions of the internal carotid arteries and basilar arteries indicating patency. The corpus callosum, cerebellar tonsils of midline structures are normal. There is mucosal thickening in the right maxillary sinus. The remainder the paranasal sinuses and mastoid air cells are clear. IMPRESSION: No acute intracranial process Electronically signed by:  Marisa Darling MD  7/18/2022 6:38 PM CDT Workstation: [id number]    MRI Cervical Spine W WO Cont    Result Date: 7/20/2022  MRI of the cervical spine with and without contrast HISTORY: Demyelinating disease Multiplanar pre and postcontrast imaging are performed before and following intravenous administration of 6.5 mL Gadavist. There is degradation of the examination by patient motion.  The cervical vertebral bodies are appropriately maintained in height. There is minimal anterolisthesis at C4-5. There is otherwise satisfactory alignment. No pathologic marrow replacement is observed. There is mild disc space narrowing at C5-6 and C6-7. No definite focal cord signal abnormality identified. The cervical segment of the spinal cord appears normal in caliber. At C2-3, there is no disc bulging. The central canal and foramina are patent. At C3-4, there is no disc bulging. The central canal and foramina are patent. At C4-5, there is minimal bulging of the disc margin without significant central canal narrowing. There are mild facet degenerative changes. There are minor degrees of foraminal narrowing. At C5-6, there is shallow chronic bulging of the disc margin and posteriorly directed marginal osteophyte formation resulting in mild diffuse mass effect upon the thecal sac without direct impingement of the cervical spinal cord. There is moderate right foraminal and mild left foraminal stenosis. At C6-7, chronic disc bulging and marginal osteophyte formation results in a mild to moderate degree of diffuse mass effect upon the thecal sac without direct impingement of the cervical spinal cord. There is moderate right foraminal and mild left foraminal narrowing. At C7-T1, there is no disc bulging. The central canal and foramina are patent. There is no pathologic intradural or intramedullary enhancement following contrast administration. IMPRESSION: Degradation by motion. No definite cord signal abnormality. Degenerative disc changes. Electronically signed by:  Deejay Avelar MD  7/20/2022 8:34 AM CDT Workstation: [id number]    MRI Thoracic Spine W WO Cont    Result Date: 7/20/2022  MRI of the thoracic spine with and without contrast HISTORY: Demyelinating disease. Multiplanar pre and postcontrast imaging are performed before and following intravenous administration of 6.5 mL Gadavist. There is degradation of the  examination by patient motion. Axial images do not include the entirety of the thoracic spinal cord. The thoracic spinal cord appears normal in caliber. There are no definite focal cord signal abnormality is demonstrated. The thoracic vertebral bodies are appropriately maintained in height. There is satisfactory alignment. There is preservation of disc space height. There is no pathologic marrow replacement. Minimal disc bulging is observed at the T3-4 level contributing to minor mass effect upon the thecal sac. There are no findings of significant central spinal canal or foraminal encroachment. Following contrast administration, there is no pathologic intradural or intramedullary enhancement. The paraspinal soft tissues appear unremarkable. IMPRESSION: Degradation by patient motion. The entirety of the thoracic spinal cord is not included on axial imaging. No definite cord signal abnormality identified. Minimal disc bulging at T3-4. Electronically signed by:  Deejay Avelar MD  7/20/2022 8:25 AM CDT Workstation: [id number]    US Carotid Bilateral    Result Date: 7/18/2022  CMS MANDATED QUALITY DATA - CAROTID - 195 All measurements and percent stenosis described below were determined using NASCET criteria or criteria similar to NASCET, as defined by the Society of Radiologists in Ultrasound Consensus Conference, Radiology, 2003 US CAROTID DOPPLER BILATERAL CLINICAL HISTORY: 46 years Male left sided numbness COMPARISON: None FINDINGS: Grayscale, color Doppler, and spectral Doppler listhesis of the carotid arteries was performed. Peak systolic velocity in the right CCA is 89.9 cm/sec, and peak systolic velocity in the right ICA is 74.2 cm/sec, with ICA/CCA ratio of less than 2. Peak systolic velocity in the left CCA is 108.1 cm/sec, and peak systolic velocity in the left ICA is 75.5 cm/sec, with ICA/CCA ratio of less than 2. The vertebral arteries are patent, with normal waveforms, and normal antegrade flow bilaterally.  IMPRESSION: 1. No Doppler evidence of carotid artery occlusion or hemodynamically significant stenosis. 2. Patent vertebral arteries with antegrade flow bilaterally. Electronically signed by:  Martinez Aparicio MD  7/18/2022 4:08 PM CDT Workstation: [id number]    Echo Saline Bubble? Yes    Result Date: 7/18/2022  · The left ventricle is normal in size with concentric remodeling and normal systolic function. · There is no evidence of intracardiac shunting. Bubble study negative for PFO · The estimated ejection fraction is 55%. · Normal left ventricular diastolic function. · Atrial fibrillation not observed. · Normal right ventricular size with normal right ventricular systolic function. · Normal central venous pressure (3 mmHg). · The estimated PA systolic pressure is 19 mmHg.      MRI Brain Demyelinating W W/O Contrast    Result Date: 7/20/2022  MRI of the brain with and without contrast HISTORY: Demyelinating disease. Multiplanar pre and postcontrast imaging are performed before and following intravenous administration of 6.5 mL Gadavist. There are several small scattered foci of white matter hyperintensity, predominantly within the subcortical regions of both hemispheres. There is a single small focus of increased white matter signal within the right cerebellar hemisphere. There is no restricted diffusion to suggest acute ischemia. The findings are compatible with nonspecific demyelination. Similar findings can be seen in the setting of demyelinating processes such as multiple sclerosis, vasculitides, chronic migraine headaches or chronic microvascular ischemia. There are no findings of acute hemorrhage or infarction. There is no mass effect or midline shift. The ventricular system is appropriate in size and position for age. There is incidental note made of cavum septum pellucidum/cavum vergae. Following contrast administration, there is no pathologic enhancement. There is diminished flow related signal within the  intracranial segment of the left vertebral artery which could reflect diminished or absent flow. The skull base and craniocervical junction appear unremarkable. Scattered mucosal thickening is noted within the paranasal sinuses. IMPRESSION: Small scattered foci of nonspecific white matter hyperintensity as detailed above. Diminished flow related signal involving the intracranial segment of the left vertebral artery. Mild motion degradation. Mild scattered mucosal thickening within the paranasal sinuses. Electronically signed by:  Deejay Avelar MD  7/20/2022 7:52 AM CDT Workstation: [id number]    FL Lumbar Puncture (xpd)    Result Date: 7/20/2022  Reason: Suspected demyelinating disease. PROCEDURE: Fluoroscopically guided lumbar puncture PROCEDURE NOTE: After obtaining informed consent, the L4-5 posterior interspace was localized under fluoroscopy and the overlying skin was marked. Skin was prepped and draped in the usual sterile fashion and 1% Lidocaine was used to achieve adequate local anesthesia. A 22-gauge spinal needle was introduced and fluoroscopically guided into the thecal sac, confirmed by return of clear colorless CSF. A total of 8 mL of CSF were obtained into 4 separate vials. The stylet was replaced, and needle removed. The patient tolerated the procedure well with no immediate complications and no significant blood loss. IMPRESSION: Successful fluoroscopically guided lumbar puncture. Electronically signed by:  Peter Johns DO  7/20/2022 10:20 AM CDT Workstation: [id number]    Pending Diagnostic Studies:     Procedure Component Value Units Date/Time    Angiotensin converting enzyme [[id number]] Collected: 07/20/22 0502    Order Status: Sent Lab Status: In process Updated: 07/20/22 0520    Specimen: Blood     MS Profile Blood Collection [[id number]]     Order Status: Sent Lab Status: No result     Specimen: Blood     Ms Profile [[id number]] Collected: 07/20/22 0906    Order Status: Sent Lab Status: No  result     Specimen: CSF (Spinal Fluid) from Cerebrospinal Fluid          Medications:  Reconciled Home Medications:      Medication List      CONTINUE taking these medications    tamsulosin 0.4 mg Cap  Commonly known as: FLOMAX  Take 0.4 mg by mouth once daily.            Indwelling Lines/Drains at time of discharge:   Lines/Drains/Airways     None                 Time spent on the discharge of patient: 28 minutes         Laxmi Gonzalez MD  Department of Hospital Medicine  [facility]

## 2022-07-20 NOTE — PROGRESS NOTES
"[facility]  Neurology Progress Note    Patient Name: Dionisio Devlin  MRN: [id number]  : 1975  TODAY'S DATE: 2022  ADMIT DATE: 2022 11:19 AM                                          CONSULTED PROVIDER: Annabelle Pike MD, Neurologist. Cellphone: [phone]  CONSULT REQUESTED BY: aLxmi Gonzalez MD    Chief Complaint   Patient presents with    Extremity Weakness     LUE numbness and weakness to LUE. About 20 minutes PTA while at work.        HPI per EMR:  Mr. Devlin is a 46 year old male with a history of BPH who presents today with complaints of LUE and facial numbness. It is moderate. It is associated with subjective left UE weakness and a "funny taste in my mouth" that has since resolved. He denies vision changes, headache, fever, chills, N/V/D, chest pain, SOB, dizziness, facial asymmetry, word finding difficulties, slurred speech, or LOC. He states he was walking in the shop at work and noticed a funny taste in his mouth and brief episode of left facial and left arm numbness that has resolved on arrival to the ED. He also reports that his wife found out her aunt passed away this morning and the morning was particularly stressful. CT head is negative for acute abnormality.     Neurology Consult:  Patient was seen and examined by me today.  He is a 46-year-old man with history of BPH and reported history of hydrocephalus when he was a child who presented with left upper extremity and facial numbness.  He was shopping when the symptoms occurred.  States that he felt 1st the numbness in his left arm followed by numbness in the left side of his face in a weird taste in his mouth.  The symptoms lasted less than 5 minutes and resolved spontaneously without treatment.  He does report increased stress levels as family member passed away recently, but he denies any history of other neuro deficits including weakness, vision loss, blurred vision, double vision, heat intolerance, bowel " or bladder incontinence, MS esmer.  He does not have any risk factors for stroke and has a healthy lifestyle including frequent physical activity and good nutrition.  He has never smoked in his life.    7/20/22: Patient was seen and examined by me today. He underwent LP with no immediate complications. He denies any new neuro deficits. There were no acute events overnight.      Scheduled Meds:   morphine  2 mg Intravenous Once    tamsulosin  0.4 mg Oral Daily     Continuous Infusions:   sodium chloride 0.9%       PRN Meds:.acetaminophen, calcium chloride IVPB, calcium chloride IVPB, calcium chloride IVPB, labetalol, magnesium oxide, magnesium sulfate IVPB, magnesium sulfate IVPB, magnesium sulfate IVPB, magnesium sulfate IVPB, potassium chloride in water, potassium chloride in water, potassium chloride in water, potassium chloride in water, potassium chloride, potassium chloride, potassium chloride, potassium chloride, sodium chloride 0.9%, sodium phosphate IVPB, sodium phosphate IVPB, sodium phosphate IVPB, sodium phosphate IVPB, sodium phosphate IVPB    Physical Exam  Current Vitals:  Vitals:    07/20/22 1100   BP:    Pulse:    Resp:    Temp: 98.2 °F (36.8 °C)       Physical Exam:  General: AO x4  HEENT: PERRL, EOMI  CV: RRR  Lungs: CTAB  Abdomen: +BS, S, NT, ND    Neurological Exam    MENTAL STATUS EXAM:  Level of alertness: Alert  Level of attention: Attentive w/out deficit  Orientation/Awareness: intact to person, place, time, situation  Language: fluent. Comprehension/repetition/naming intact    CRANIAL NERVE EXAM:  II/III: fundoscopic exam deferred, PERRL; visual fields full to confrontation; no gross deficit on visual acuity  III/IV/VI: EOMI with horizontal nystagmus. No strabismus, or evoked diplopia  V: no deficits appreciated to pinprick, temp, vibration; masseter strength intact bilaterally  VII: no facial asymmetry noted  VIII: no deficits in hearing bilaterally  IX/X: palate @ ML and raises  symmetrically  XI: shoulder shrug 5/5 bilaterally; head turn 5/5 bilaterally  XII: tongue to midline w/out asymmetry  No dysarthria noted on exam.    MOTOR EXAM:  Bulk and Tone: normal throughout  Strength is 5/5 proximally and distally in upper and lower extremities without deficits.  No pronator drift in bilateral UE. No tremor either at rest or with intention.    REFLEXES:  Masseter (CN V) Not Tested  3+ in bilateral upper and lower extremities, there is bilateral Bella's, pectoral spread and crossed adductors in legs as well as unsustained 2 beat clonus bilaterally and downgoing toes.        SENSORY EXAM  Light touch and vibration are intact throughout in upper and lower extremities without deficits.    COORDINATION/CEREBELLAR EXAM:  FTN: no dysmetria or other signs of appendicular ataxia  HTS: No evidence of appendicular ataxia    GAIT:  Normal, able to tandem, tiptoe walk and heel walk as well.  There is no gait imbalance.    Laboratory Data & Studies    Recent Labs   Lab 07/18/22 1129 07/19/22 0457 07/20/22  0502   WBC 4.59 4.04 5.82   HGB 14.3 15.0 15.0    217 218   MCV 92 94 93       Recent Labs   Lab 07/18/22 1129 07/19/22 0457 07/20/22  0502    139 133*   K 3.7 4.4 3.9    105 99   CO2 30* 29 27   BUN 13 11 14   * 98 91   CALCIUM 9.0 9.0 8.7   MG  --  2.3  --    PHOS  --  3.6  --        Recent Labs   Lab 07/18/22 1129 07/19/22 0457 07/20/22  0502   PROT 7.0 6.7 6.5   ALBUMIN 4.5 4.1 4.2   BILITOT 0.8 0.9 1.0   AST 28 27 26   ALT 28 26 28   ALKPHOS 56 56 57       Recent Labs   Lab 07/18/22 1129 07/19/22 0457   LABPT 13.8* 14.1*   INR 1.1 1.2   APTT  --  34.2       Recent Labs   Lab 07/18/22 1129 07/19/22  0457   HGBA1C  --  5.4   CHOL 180  --    TRIG 82  --    LDLCALC 107.6  --    HDL 56  --    TSH 4.490  --        Imaging:  CT Head Without Contrast    Result Date: 7/18/2022  CMS MANDATED QUALITY DATA - CT RADIATION  436 All CT scans at this facility utilize dose  modulation, iterative reconstruction, and/or weight based dosing when appropriate to reduce radiation dose to as low as reasonably achievable. CT HEAD WITHOUT IV CONTRAST CLINICAL HISTORY: 46 years Male Neuro deficit, acute, stroke suspected COMPARISON: None FINDINGS: Negative for acute intracranial hemorrhage, midline shift, or mass effect. Ventricles and sulci are normal in size. Cavum septum pellucidum. Mild bilateral basal ganglia calcifications. Gray-white differentiation is maintained. Cerebellar hemispheres and brainstem are unremarkable. No calvarial lesion or fracture. Mastoid air cells are clear. Mucosal thickening involving the maxillary sinuses, left greater than right. IMPRESSION: No CT evidence of acute intracranial pathology. Maxillary sinus mucosal thickening. Electronically signed by:  Martinez Aparicio MD  7/18/2022 12:00 PM CDT Workstation: [id number]    MRA Brain    Result Date: 7/18/2022  HISTORY: Left facial numbness FINDINGS: 3D TOF MRA without contrast was performed, with maximum intensity projections reviewed.The internal carotid and basilar artery and distal right vertebral artery are patent, without evidence of aneurysm, occlusion or significant stenosis. The anterior, middle and posterior cerebral arteries demonstrate no aneurysm, stenosis or vascular malformation. There is nonvisualization of the distal left vertebral artery which may represent proximal stenosis IMPRESSION: Normal MRA of the brain Nonvisualization of the distal left vertebral artery which may be secondary to proximal stenosis. CT of the neck is recommended for further evaluation Electronically signed by:  Marisa Darling MD  7/18/2022 6:41 PM CDT Workstation: [id number]    MRI BRAIN WITHOUT CONTRAST    Result Date: 7/18/2022  MRI of the brain is compared to a prior CT at 11:43 AM Clinical history is extremity weakness Ventricles and sulci are normal in size and configuration for age. There is no hemorrhage, mass or  midline shift. There are no extra-axial fluid collections. There is normal signal within the white matter on all sequences. There is no abnormality on the diffusion-weighted images to suggest acute infarct. The cerebellum and brainstem are normal. There is a patent cavum septum pellucidum. There are flow voids within the cavernous portions of the internal carotid arteries and basilar arteries indicating patency. The corpus callosum, cerebellar tonsils of midline structures are normal. There is mucosal thickening in the right maxillary sinus. The remainder the paranasal sinuses and mastoid air cells are clear. IMPRESSION: No acute intracranial process Electronically signed by:  Marisa Darling MD  7/18/2022 6:38 PM CDT Workstation: [id number]    US Carotid Bilateral    Result Date: 7/18/2022  CMS MANDATED QUALITY DATA - CAROTID - 195 All measurements and percent stenosis described below were determined using NASCET criteria or criteria similar to NASCET, as defined by the Society of Radiologists in Ultrasound Consensus Conference, Radiology, 2003 US CAROTID DOPPLER BILATERAL CLINICAL HISTORY: 46 years Male left sided numbness COMPARISON: None FINDINGS: Grayscale, color Doppler, and spectral Doppler listhesis of the carotid arteries was performed. Peak systolic velocity in the right CCA is 89.9 cm/sec, and peak systolic velocity in the right ICA is 74.2 cm/sec, with ICA/CCA ratio of less than 2. Peak systolic velocity in the left CCA is 108.1 cm/sec, and peak systolic velocity in the left ICA is 75.5 cm/sec, with ICA/CCA ratio of less than 2. The vertebral arteries are patent, with normal waveforms, and normal antegrade flow bilaterally. IMPRESSION: 1. No Doppler evidence of carotid artery occlusion or hemodynamically significant stenosis. 2. Patent vertebral arteries with antegrade flow bilaterally. Electronically signed by:  Martinez Aparicio MD  7/18/2022 4:08 PM CDT Workstation: [id number]    Echo Saline Bubble?  Yes    Result Date: 7/18/2022  · The left ventricle is normal in size with concentric remodeling and normal systolic function. · There is no evidence of intracardiac shunting. Bubble study negative for PFO · The estimated ejection fraction is 55%. · Normal left ventricular diastolic function. · Atrial fibrillation not observed. · Normal right ventricular size with normal right ventricular systolic function. · Normal central venous pressure (3 mmHg). · The estimated PA systolic pressure is 19 mmHg.          Assessment and Plan:    Left sided weakness  Abnormal MRI - bilateral T2 hyperintensities  Multiple sclerosis rule out  Hyperreflexia    46-year-old man with history of BPH and reported history of hydrocephalus when he was a child who presented with sudden onset left upper extremity and facial numbness that lasted less than 5 minutes and resolved spontaneously.  MRI brain was negative for stroke, but did show several bilateral T2 hyperintensities concerning for demyelinating disease (such as MS or NMOSD) vs microvascular disease. Neuro exam is non focal, but he does have hyperreflexia in upper and lower extremities. Favoring demyelination due to patient's age and lack of stroke risk factors. MRI brain with and wo contrast showed no Ana enhancing lesions. MRI C-spine and T-spine showed no cord hyperintensities or Ana enhancement. CSF cell count was normal, protein mildly elevated at 42. MS profile on serum and CSF was ordered and still pending.  - Admitted to hospital medicine on the floor, with Q4H neurochecks and telemetry   - Ordered MRI brain with contrast to assess for ANA enhancement, negative  - Ordered MRI Cervical and thoracic spine with and wo contrast to assess for demyelination, with no cord lesions evident  - CSF studies ordered: WBC 1, protein 42 (mildly elevated), glucose normal, MS profile (blood and CSF) pending, aquaporin 4 Abs (NMOSD) pending  - Patient can be discharged home with close follow up  with neurology to discuss results of tests      · DVT prophylaxis with chemo/SCD prophylaxis  · Extensively discussed lifestyle modifications as prophylactic measures for stroke prevention including smoking cessation, adequate blood pressure management, healthy diet and regular exercise.    [phone] within 3 days from discharge.     Stroke education was provided including stroke risk factors modification and any acute neurological changes including weakness, confusion, visual changes to come straight to the ER.     All questions were answered.                              Thank you kindly for including us in the care of this patient. Please do not hesitate to contact us with any questions.      35 minutes of care time has been spent evaluating with the patient. Time includes chart review not limited to diagnostic imaging, labs, and vitals, patient assessment, discussion with family and nursing, current order evaluations, and new order entries.      Annabelle Pike MD  Neurology

## 2022-07-20 NOTE — NURSING
Pt verbalized understanding of discharge instruction, written instructions provided, states no questions at this time. Belongings returned to pt, pt to vehicle via wheelchair to home for self-care.

## 2022-07-20 NOTE — PLAN OF CARE
Discharge orders and chart reviewed with no further post-acute discharge needs identified at this time.  At this time, patient is cleared for discharge from Case Management standpoint.        07/20/22 1450   Final Note   Assessment Type Final Discharge Note   Anticipated Discharge Disposition Home   Hospital Resources/Appts/Education Provided Appointments scheduled and added to AVS   Post-Acute Status   Discharge Delays None known at this time

## 2022-07-22 LAB — ACE SERPL-CCNC: 34 U/L (ref 14–82)

## 2022-07-25 LAB
LABCORP MISC TEST CODE: NORMAL
LABCORP MISC TEST NAME: NORMAL
LABCORP MISCELLANEOUS TEST: NORMAL

## 2022-07-26 LAB
LABCORP MISC TEST CODE: NORMAL
LABCORP MISC TEST NAME: NORMAL
LABCORP MISCELLANEOUS TEST: NORMAL

## 2022-08-01 ENCOUNTER — OFFICE VISIT (OUTPATIENT)
Dept: FAMILY MEDICINE | Facility: CLINIC | Age: 47
End: 2022-08-01
Payer: COMMERCIAL

## 2022-08-01 VITALS
SYSTOLIC BLOOD PRESSURE: 110 MMHG | WEIGHT: 149.63 LBS | HEART RATE: 78 BPM | HEIGHT: 66 IN | RESPIRATION RATE: 18 BRPM | TEMPERATURE: 98 F | BODY MASS INDEX: 24.05 KG/M2 | DIASTOLIC BLOOD PRESSURE: 86 MMHG | OXYGEN SATURATION: 98 %

## 2022-08-01 DIAGNOSIS — G45.9 TIA (TRANSIENT ISCHEMIC ATTACK): Primary | ICD-10-CM

## 2022-08-01 DIAGNOSIS — G43.809 OTHER MIGRAINE WITHOUT STATUS MIGRAINOSUS, NOT INTRACTABLE: ICD-10-CM

## 2022-08-01 DIAGNOSIS — F41.9 ANXIETY: ICD-10-CM

## 2022-08-01 PROBLEM — G43.909 MIGRAINE WITHOUT STATUS MIGRAINOSUS, NOT INTRACTABLE: Status: ACTIVE | Noted: 2022-08-01

## 2022-08-01 PROCEDURE — 3074F PR MOST RECENT SYSTOLIC BLOOD PRESSURE < 130 MM HG: ICD-10-PCS | Mod: CPTII,S$GLB,, | Performed by: FAMILY MEDICINE

## 2022-08-01 PROCEDURE — 3008F PR BODY MASS INDEX (BMI) DOCUMENTED: ICD-10-PCS | Mod: CPTII,S$GLB,, | Performed by: FAMILY MEDICINE

## 2022-08-01 PROCEDURE — 99204 OFFICE O/P NEW MOD 45 MIN: CPT | Mod: S$GLB,,, | Performed by: FAMILY MEDICINE

## 2022-08-01 PROCEDURE — 3044F HG A1C LEVEL LT 7.0%: CPT | Mod: CPTII,S$GLB,, | Performed by: FAMILY MEDICINE

## 2022-08-01 PROCEDURE — 1160F RVW MEDS BY RX/DR IN RCRD: CPT | Mod: CPTII,S$GLB,, | Performed by: FAMILY MEDICINE

## 2022-08-01 PROCEDURE — 1160F PR REVIEW ALL MEDS BY PRESCRIBER/CLIN PHARMACIST DOCUMENTED: ICD-10-PCS | Mod: CPTII,S$GLB,, | Performed by: FAMILY MEDICINE

## 2022-08-01 PROCEDURE — 3044F PR MOST RECENT HEMOGLOBIN A1C LEVEL <7.0%: ICD-10-PCS | Mod: CPTII,S$GLB,, | Performed by: FAMILY MEDICINE

## 2022-08-01 PROCEDURE — 3079F DIAST BP 80-89 MM HG: CPT | Mod: CPTII,S$GLB,, | Performed by: FAMILY MEDICINE

## 2022-08-01 PROCEDURE — 99204 PR OFFICE/OUTPT VISIT, NEW, LEVL IV, 45-59 MIN: ICD-10-PCS | Mod: S$GLB,,, | Performed by: FAMILY MEDICINE

## 2022-08-01 PROCEDURE — 3074F SYST BP LT 130 MM HG: CPT | Mod: CPTII,S$GLB,, | Performed by: FAMILY MEDICINE

## 2022-08-01 PROCEDURE — 1159F PR MEDICATION LIST DOCUMENTED IN MEDICAL RECORD: ICD-10-PCS | Mod: CPTII,S$GLB,, | Performed by: FAMILY MEDICINE

## 2022-08-01 PROCEDURE — 1159F MED LIST DOCD IN RCRD: CPT | Mod: CPTII,S$GLB,, | Performed by: FAMILY MEDICINE

## 2022-08-01 PROCEDURE — 3079F PR MOST RECENT DIASTOLIC BLOOD PRESSURE 80-89 MM HG: ICD-10-PCS | Mod: CPTII,S$GLB,, | Performed by: FAMILY MEDICINE

## 2022-08-01 PROCEDURE — 3008F BODY MASS INDEX DOCD: CPT | Mod: CPTII,S$GLB,, | Performed by: FAMILY MEDICINE

## 2022-08-01 RX ORDER — ALPRAZOLAM 0.5 MG/1
0.5 TABLET ORAL DAILY PRN
Qty: 15 TABLET | Refills: 0 | Status: SHIPPED | OUTPATIENT
Start: 2022-08-01

## 2022-08-01 RX ORDER — ALPRAZOLAM 0.5 MG/1
0.5 TABLET ORAL 3 TIMES DAILY
COMMUNITY
End: 2022-08-01 | Stop reason: SDUPTHER

## 2022-08-01 RX ORDER — ASPIRIN 81 MG/1
81 TABLET ORAL DAILY
Qty: 365 TABLET | Refills: 5 | Status: SHIPPED | OUTPATIENT
Start: 2022-08-01 | End: 2023-08-01

## 2022-08-01 RX ORDER — ESCITALOPRAM OXALATE 10 MG/1
10 TABLET ORAL DAILY
Qty: 30 TABLET | Refills: 2 | Status: SHIPPED | OUTPATIENT
Start: 2022-08-01 | End: 2022-08-29 | Stop reason: SDUPTHER

## 2022-08-01 RX ORDER — ESCITALOPRAM OXALATE 10 MG/1
10 TABLET ORAL DAILY
COMMUNITY
End: 2022-08-01 | Stop reason: SDUPTHER

## 2022-08-01 NOTE — PROGRESS NOTES
Subjective:       Patient ID: Dionisio Devlin is a 46 y.o. male.    Chief Complaint: Follow-up (Hospital/)    HPI SONNY 7/18-7/20 [facility]  Patient presents to clinic for hospital follow up. Last seen in office 3 years ago. He presented to ED after walking through shop at work and started getting a bad taste in his mouth with numbness of left face and left arm. Resolved after 1 minute. Denies any other symptoms when this happened. He believes this incident may be stress related. He states the morning of this his wife and son were arguing and his wife's aunt passed away. He states has rare headaches in the front his forehead. No visual changes or vomiting. Sometimes will get nauseous and congested. MRI in ED was normal. Lumbar puncture was negative. No evidence of MS. Spinal fluid was normal. Echo was normal. All labs were normal. Recommended to start Aspirin 81 mg. Follow up with Neurologist.     He is requesting an anti-anxiety medication due to stressors in his life. No FH of mental illness.   Review of Systems   Respiratory: Negative.    Cardiovascular: Negative.  Negative for chest pain and palpitations.   Neurological: Positive for headaches.   Psychiatric/Behavioral: The patient is nervous/anxious.          Objective:      Physical Exam  Constitutional:       Appearance: Normal appearance.   Neck:      Vascular: No carotid bruit.   Cardiovascular:      Rate and Rhythm: Normal rate and regular rhythm.      Pulses: Normal pulses.      Heart sounds: Normal heart sounds.   Pulmonary:      Effort: Pulmonary effort is normal.      Breath sounds: Normal breath sounds.   Lymphadenopathy:      Cervical: No cervical adenopathy.   Skin:     General: Skin is warm and dry.   Neurological:      General: No focal deficit present.      Mental Status: He is alert.      Sensory: Sensation is intact.      Motor: Motor function is intact.      Coordination: Coordination is intact.      Gait: Gait is intact.      Deep Tendon  Reflexes:      Reflex Scores:       Tricep reflexes are 2+ on the right side and 2+ on the left side.       Bicep reflexes are 2+ on the right side and 2+ on the left side.       Brachioradialis reflexes are 2+ on the right side and 2+ on the left side.       Patellar reflexes are 2+ on the right side and 2+ on the left side.       Achilles reflexes are 2+ on the right side and 2+ on the left side.  Psychiatric:         Mood and Affect: Mood normal.         Behavior: Behavior normal.         Assessment/Plan:     TIA (transient ischemic attack)  -     Cancel: Ambulatory referral/consult to Neurology; Future; Expected date: 08/08/2022    Other migraine without status migrainosus, not intractable    Anxiety  -     Cancel: Ambulatory referral/consult to Neurology; Future; Expected date: 08/08/2022    Other orders  -     ALPRAZolam (XANAX) 0.5 MG tablet; Take 1 tablet (0.5 mg total) by mouth daily as needed for Anxiety.  Dispense: 15 tablet; Refill: 0  -     EScitalopram oxalate (LEXAPRO) 10 MG tablet; Take 1 tablet (10 mg total) by mouth once daily.  Dispense: 30 tablet; Refill: 2  -     aspirin (ECOTRIN) 81 MG EC tablet; Take 1 tablet (81 mg total) by mouth once daily.  Dispense: 365 tablet; Refill: 5     Discussion with him of the episode.  This may have been a TIA.  Possibly was migraine with neurologic deficit without the ensuing headache.  His sinus headaches that he has certainly sound like migraine.  Recommend he follow-up with Neurology but since the etiology these episodes cannot be certain start aspirin 81 mg daily.  He can have some Xanax 0.5 15 of these daily maximum p.r.n. for anxiety.  Recommend he start Lexapro 10 mg daily for the anxiety.  Thirty with 2 refills follow-up in 3-4 weeks.

## 2022-08-05 ENCOUNTER — PATIENT MESSAGE (OUTPATIENT)
Dept: ADMINISTRATIVE | Facility: HOSPITAL | Age: 47
End: 2022-08-05
Payer: COMMERCIAL

## 2022-08-29 ENCOUNTER — OFFICE VISIT (OUTPATIENT)
Dept: FAMILY MEDICINE | Facility: CLINIC | Age: 47
End: 2022-08-29
Payer: COMMERCIAL

## 2022-08-29 VITALS
WEIGHT: 151.38 LBS | HEART RATE: 73 BPM | OXYGEN SATURATION: 98 % | HEIGHT: 66 IN | BODY MASS INDEX: 24.33 KG/M2 | SYSTOLIC BLOOD PRESSURE: 121 MMHG | DIASTOLIC BLOOD PRESSURE: 83 MMHG

## 2022-08-29 DIAGNOSIS — G45.9 TIA (TRANSIENT ISCHEMIC ATTACK): ICD-10-CM

## 2022-08-29 DIAGNOSIS — F41.9 ANXIETY: Primary | ICD-10-CM

## 2022-08-29 PROCEDURE — 3044F HG A1C LEVEL LT 7.0%: CPT | Mod: CPTII,S$GLB,, | Performed by: FAMILY MEDICINE

## 2022-08-29 PROCEDURE — 3074F SYST BP LT 130 MM HG: CPT | Mod: CPTII,S$GLB,, | Performed by: FAMILY MEDICINE

## 2022-08-29 PROCEDURE — 3008F BODY MASS INDEX DOCD: CPT | Mod: CPTII,S$GLB,, | Performed by: FAMILY MEDICINE

## 2022-08-29 PROCEDURE — 1160F PR REVIEW ALL MEDS BY PRESCRIBER/CLIN PHARMACIST DOCUMENTED: ICD-10-PCS | Mod: CPTII,S$GLB,, | Performed by: FAMILY MEDICINE

## 2022-08-29 PROCEDURE — 3079F DIAST BP 80-89 MM HG: CPT | Mod: CPTII,S$GLB,, | Performed by: FAMILY MEDICINE

## 2022-08-29 PROCEDURE — 3079F PR MOST RECENT DIASTOLIC BLOOD PRESSURE 80-89 MM HG: ICD-10-PCS | Mod: CPTII,S$GLB,, | Performed by: FAMILY MEDICINE

## 2022-08-29 PROCEDURE — 99213 OFFICE O/P EST LOW 20 MIN: CPT | Mod: S$GLB,,, | Performed by: FAMILY MEDICINE

## 2022-08-29 PROCEDURE — 3008F PR BODY MASS INDEX (BMI) DOCUMENTED: ICD-10-PCS | Mod: CPTII,S$GLB,, | Performed by: FAMILY MEDICINE

## 2022-08-29 PROCEDURE — 1159F PR MEDICATION LIST DOCUMENTED IN MEDICAL RECORD: ICD-10-PCS | Mod: CPTII,S$GLB,, | Performed by: FAMILY MEDICINE

## 2022-08-29 PROCEDURE — 1159F MED LIST DOCD IN RCRD: CPT | Mod: CPTII,S$GLB,, | Performed by: FAMILY MEDICINE

## 2022-08-29 PROCEDURE — 3074F PR MOST RECENT SYSTOLIC BLOOD PRESSURE < 130 MM HG: ICD-10-PCS | Mod: CPTII,S$GLB,, | Performed by: FAMILY MEDICINE

## 2022-08-29 PROCEDURE — 1160F RVW MEDS BY RX/DR IN RCRD: CPT | Mod: CPTII,S$GLB,, | Performed by: FAMILY MEDICINE

## 2022-08-29 PROCEDURE — 99213 PR OFFICE/OUTPT VISIT, EST, LEVL III, 20-29 MIN: ICD-10-PCS | Mod: S$GLB,,, | Performed by: FAMILY MEDICINE

## 2022-08-29 PROCEDURE — 3044F PR MOST RECENT HEMOGLOBIN A1C LEVEL <7.0%: ICD-10-PCS | Mod: CPTII,S$GLB,, | Performed by: FAMILY MEDICINE

## 2022-08-29 RX ORDER — ESCITALOPRAM OXALATE 10 MG/1
10 TABLET ORAL DAILY
Qty: 30 TABLET | Refills: 2 | Status: SHIPPED | OUTPATIENT
Start: 2022-08-29

## 2022-08-29 NOTE — PROGRESS NOTES
Subjective:       Patient ID: Dionisio Devlin is a 46 y.o. male.    Chief Complaint: Follow-up (Possible side effect to previous prescribed med.)    Follow-up  Pertinent negatives include no chest pain.   Patient presents to clinic for follow up. Hx of TIA. Denies any stroke like episodes. Not currently taking Aspirin. Saw neurologist and was cleared. Resume aspirin. Anxiety. Started Lexapro after last visit but discontinued after 1 week when getting sunburn at the beach. He was concerned about photodermatitis. Sunburn was isolated to legs. Not likely to be photodermatitis. Resume Lexapro. Has not needed to take Xanax.    Review of Systems   Respiratory: Negative.     Cardiovascular: Negative.  Negative for chest pain and palpitations.   Hematological: Negative.    Psychiatric/Behavioral: Negative.         Objective:      Physical Exam  Constitutional:       Appearance: Normal appearance.   Neck:      Vascular: No carotid bruit.   Cardiovascular:      Rate and Rhythm: Normal rate and regular rhythm.      Pulses: Normal pulses.      Heart sounds: Normal heart sounds.   Pulmonary:      Effort: Pulmonary effort is normal.      Breath sounds: Normal breath sounds.   Lymphadenopathy:      Cervical: No cervical adenopathy.   Skin:     General: Skin is warm and dry.   Neurological:      Mental Status: He is alert.   Psychiatric:         Mood and Affect: Mood normal.         Behavior: Behavior normal.       Assessment/Plan:     Anxiety    TIA (transient ischemic attack)    Other orders  -     EScitalopram oxalate (LEXAPRO) 10 MG tablet; Take 1 tablet (10 mg total) by mouth once daily.  Dispense: 30 tablet; Refill: 2     Continue aspirin for the TIA.  Resume the Lexapro 10 mg daily.  Do not feel that this causes rash feel that the rash on the legs was strictly sunburn.  Not photo dermatitis.  Did not have it on face or arms.  Follow-up here in 6 weeks on the Lexapro.

## 2023-02-09 ENCOUNTER — TELEPHONE (OUTPATIENT)
Dept: FAMILY MEDICINE | Facility: CLINIC | Age: 48
End: 2023-02-09
Payer: COMMERCIAL

## 2023-02-09 NOTE — TELEPHONE ENCOUNTER
----- Message from Mayra Cortez sent at 2/9/2023 10:04 AM CST -----  Contact: [phone]  Type: Needs Medical Advice  Who Called:  pt  Best Call Back Number: [phone]  Additional Information: pt needs records for his insurance and wants to come and pick them up. Please call back and advise

## 2023-04-11 ENCOUNTER — PATIENT MESSAGE (OUTPATIENT)
Dept: ADMINISTRATIVE | Facility: HOSPITAL | Age: 48
End: 2023-04-11
Payer: COMMERCIAL

## 2023-07-07 ENCOUNTER — LAB VISIT (OUTPATIENT)
Dept: LAB | Facility: HOSPITAL | Age: 48
End: 2023-07-07
Attending: SPECIALIST
Payer: COMMERCIAL

## 2023-07-07 DIAGNOSIS — Z12.5 SPECIAL SCREENING FOR MALIGNANT NEOPLASM OF PROSTATE: Primary | ICD-10-CM

## 2023-07-07 LAB — COMPLEXED PSA SERPL-MCNC: 0.35 NG/ML (ref 0–4)

## 2023-07-07 PROCEDURE — 84153 ASSAY OF PSA TOTAL: CPT | Performed by: SPECIALIST

## 2023-07-07 PROCEDURE — 36415 COLL VENOUS BLD VENIPUNCTURE: CPT | Performed by: SPECIALIST

## 2023-12-27 ENCOUNTER — TELEPHONE (OUTPATIENT)
Dept: FAMILY MEDICINE | Facility: CLINIC | Age: 48
End: 2023-12-27
Payer: COMMERCIAL

## 2023-12-28 NOTE — TELEPHONE ENCOUNTER
----- Message from Shilpi Alford sent at 12/27/2023 10:16 AM CST -----  Type:  Needs Medical Advice    Who Called:  Pt    Would the patient rather a call back or a response via [facility]?  Call back    Best Call Back Number:  [phone]    Additional Information:  Pt needs letter for TIA episode that he had last year, that he was not seen by Dr Villasenor year before needs a letter head and dr's signature per [facility] request.   Please call back to advise. Thanks!

## 2024-01-05 ENCOUNTER — PATIENT MESSAGE (OUTPATIENT)
Dept: ADMINISTRATIVE | Facility: HOSPITAL | Age: 49
End: 2024-01-05
Payer: COMMERCIAL

## 2024-01-23 ENCOUNTER — OFFICE VISIT (OUTPATIENT)
Dept: FAMILY MEDICINE | Facility: CLINIC | Age: 49
End: 2024-01-23
Payer: COMMERCIAL

## 2024-01-23 VITALS
SYSTOLIC BLOOD PRESSURE: 118 MMHG | BODY MASS INDEX: 24.22 KG/M2 | OXYGEN SATURATION: 96 % | HEART RATE: 70 BPM | DIASTOLIC BLOOD PRESSURE: 72 MMHG | WEIGHT: 150.69 LBS | HEIGHT: 66 IN

## 2024-01-23 DIAGNOSIS — G45.9 TIA (TRANSIENT ISCHEMIC ATTACK): Primary | ICD-10-CM

## 2024-01-23 PROCEDURE — 99999 PR PBB SHADOW E&M-EST. PATIENT-LVL III: CPT | Mod: PBBFAC,,, | Performed by: FAMILY MEDICINE

## 2024-01-23 PROCEDURE — 99213 OFFICE O/P EST LOW 20 MIN: CPT | Mod: S$GLB,,, | Performed by: FAMILY MEDICINE

## 2024-01-23 PROCEDURE — 3078F DIAST BP <80 MM HG: CPT | Mod: CPTII,S$GLB,, | Performed by: FAMILY MEDICINE

## 2024-01-23 PROCEDURE — 3074F SYST BP LT 130 MM HG: CPT | Mod: CPTII,S$GLB,, | Performed by: FAMILY MEDICINE

## 2024-01-23 PROCEDURE — 1160F RVW MEDS BY RX/DR IN RCRD: CPT | Mod: CPTII,S$GLB,, | Performed by: FAMILY MEDICINE

## 2024-01-23 PROCEDURE — 1159F MED LIST DOCD IN RCRD: CPT | Mod: CPTII,S$GLB,, | Performed by: FAMILY MEDICINE

## 2024-01-23 PROCEDURE — 3008F BODY MASS INDEX DOCD: CPT | Mod: CPTII,S$GLB,, | Performed by: FAMILY MEDICINE

## 2024-01-23 RX ORDER — NAPROXEN SODIUM 220 MG/1
81 TABLET, FILM COATED ORAL DAILY PRN
COMMUNITY

## 2024-01-23 NOTE — LETTER
Dr. Villasenor - Family Medicine  [address]  [place] [address]  Phone: [phone]  Fax: [phone] January 23, 2024    Dionisio Devlin  [address] [place] [id number]      To Whom It May Concern:    Dionisio Devlin was hospitalized at [facility] 7/18/2022 - 7/20/2022 for TIA. He saw me for a follow up on 8/1/2022 and 8/29/2022. This was a new problem. He was not seen at all by me for several years prior to this. He has not been seen since the 8/29/2022 visit.    If you have any questions or concerns, please feel free to call my office.    Sincerely,        Benito Villasenor III, MD

## 2024-01-28 NOTE — PROGRESS NOTES
Subjective:       Patient ID: Dionisio Devlin is a 48 y.o. male.    Chief Complaint: Follow-up (Forms )    History of a possible TIA hospitalized July of 2022 was seen here August 1st and August 29, 2022 for follow-up of that.  Had not been seen here prior to that since it least 2017.  He did follow-up with Neurology.  Hospitalized July 18th through the 20th of 2022.  Reviewed the discharge summary.  In the discharge plan.  He had an insurance that would reimburse him for the hospitalization that he did not file on at that time.  They are seeking proof that this was not a pre-existing condition apparently before pain.  Needs letter from me.    Physical examination.  Vital signs noted.  Chest clear.  Heart regular rate and rhythm.  Neck without bruit.          Objective:        Assessment:       1. TIA (transient ischemic attack)        Plan:       TIA (transient ischemic attack)    Letter regarding the care July and August of 2022.  And no prior care by me since 2017.

## 2024-02-06 DIAGNOSIS — Z12.11 COLON CANCER SCREENING: ICD-10-CM

## 2024-03-22 ENCOUNTER — LAB VISIT (OUTPATIENT)
Dept: LAB | Facility: HOSPITAL | Age: 49
End: 2024-03-22
Payer: COMMERCIAL

## 2024-03-22 DIAGNOSIS — Z12.11 COLON CANCER SCREENING: ICD-10-CM

## 2024-03-22 PROCEDURE — 82274 ASSAY TEST FOR BLOOD FECAL: CPT | Performed by: FAMILY MEDICINE

## 2024-04-03 LAB — HEMOCCULT STL QL IA: NEGATIVE

## 2024-07-22 ENCOUNTER — LAB VISIT (OUTPATIENT)
Dept: LAB | Facility: HOSPITAL | Age: 49
End: 2024-07-22
Attending: SPECIALIST
Payer: COMMERCIAL

## 2024-07-22 DIAGNOSIS — Z12.5 SPECIAL SCREENING FOR MALIGNANT NEOPLASM OF PROSTATE: Primary | ICD-10-CM

## 2024-07-22 LAB — COMPLEXED PSA SERPL-MCNC: 0.5 NG/ML (ref 0–4)

## 2024-07-22 PROCEDURE — 84153 ASSAY OF PSA TOTAL: CPT | Performed by: SPECIALIST

## 2024-07-22 PROCEDURE — 36415 COLL VENOUS BLD VENIPUNCTURE: CPT | Performed by: SPECIALIST

## 2025-04-10 ENCOUNTER — OFFICE VISIT (OUTPATIENT)
Dept: FAMILY MEDICINE | Facility: CLINIC | Age: 50
End: 2025-04-10
Payer: COMMERCIAL

## 2025-04-10 VITALS
HEART RATE: 89 BPM | HEIGHT: 66 IN | BODY MASS INDEX: 24.25 KG/M2 | WEIGHT: 150.88 LBS | TEMPERATURE: 99 F | DIASTOLIC BLOOD PRESSURE: 86 MMHG | SYSTOLIC BLOOD PRESSURE: 132 MMHG | OXYGEN SATURATION: 95 %

## 2025-04-10 DIAGNOSIS — Z56.6 STRESS AT WORK: ICD-10-CM

## 2025-04-10 DIAGNOSIS — C44.91 CANCER OF THE SKIN, BASAL CELL: ICD-10-CM

## 2025-04-10 DIAGNOSIS — R20.0 NUMBNESS: ICD-10-CM

## 2025-04-10 DIAGNOSIS — N40.0 BENIGN PROSTATIC HYPERPLASIA, UNSPECIFIED WHETHER LOWER URINARY TRACT SYMPTOMS PRESENT: ICD-10-CM

## 2025-04-10 DIAGNOSIS — Z23 NEED FOR TDAP VACCINATION: ICD-10-CM

## 2025-04-10 DIAGNOSIS — Z79.82 ASPIRIN LONG-TERM USE: ICD-10-CM

## 2025-04-10 DIAGNOSIS — G45.9 TIA (TRANSIENT ISCHEMIC ATTACK): Primary | ICD-10-CM

## 2025-04-10 DIAGNOSIS — L56.8 PHOTODERMATITIS: ICD-10-CM

## 2025-04-10 DIAGNOSIS — F41.9 ANXIETY: ICD-10-CM

## 2025-04-10 DIAGNOSIS — L29.0 RECTAL ITCHING: ICD-10-CM

## 2025-04-10 PROCEDURE — 99999 PR PBB SHADOW E&M-EST. PATIENT-LVL IV: CPT | Mod: PBBFAC,,, | Performed by: FAMILY MEDICINE

## 2025-04-10 PROCEDURE — 90715 TDAP VACCINE 7 YRS/> IM: CPT | Mod: S$GLB,,, | Performed by: FAMILY MEDICINE

## 2025-04-10 PROCEDURE — 99396 PREV VISIT EST AGE 40-64: CPT | Mod: 25,S$GLB,, | Performed by: FAMILY MEDICINE

## 2025-04-10 PROCEDURE — 90471 IMMUNIZATION ADMIN: CPT | Mod: S$GLB,,, | Performed by: FAMILY MEDICINE

## 2025-04-10 PROCEDURE — 99213 OFFICE O/P EST LOW 20 MIN: CPT | Mod: 25,S$GLB,, | Performed by: FAMILY MEDICINE

## 2025-04-10 RX ORDER — ALPRAZOLAM 0.5 MG/1
0.5 TABLET ORAL DAILY PRN
Qty: 30 TABLET | Refills: 0 | Status: SHIPPED | OUTPATIENT
Start: 2025-04-10

## 2025-04-10 RX ORDER — BUSPIRONE HYDROCHLORIDE 5 MG/1
5 TABLET ORAL 2 TIMES DAILY
COMMUNITY
End: 2025-04-10 | Stop reason: SDUPTHER

## 2025-04-10 RX ORDER — BUSPIRONE HYDROCHLORIDE 5 MG/1
5 TABLET ORAL 2 TIMES DAILY
Qty: 60 TABLET | Refills: 1 | Status: SHIPPED | OUTPATIENT
Start: 2025-04-10

## 2025-04-10 RX ORDER — HYDROCORTISONE ACETATE, PRAMOXINE HCL 2.5; 1 G/100G; G/100G
CREAM TOPICAL 3 TIMES DAILY
COMMUNITY
End: 2025-04-16 | Stop reason: SDUPTHER

## 2025-04-10 SDOH — SOCIAL DETERMINANTS OF HEALTH (SDOH): OTHER PHYSICAL AND MENTAL STRAIN RELATED TO WORK: Z56.6

## 2025-04-10 NOTE — PROGRESS NOTES
Subjective:       Patient ID: Dionisio Devlin is a 49 y.o. male.    Chief Complaint: Annual Exam and Anxiety    Here for physical.    Social history nonsmoker no alcohol intake.  No exercise.  Currently working.      Family history father  age 86.  Mother had lung cancer.  Brother and a sister healthy.  Another sister  in a motor vehicle accident.      Immunizations need TD AP.    Past medical history.  TIA back in .  On aspirin daily.  Has had a basal cell skin cancer of the scalp.  Had photo dermatitis with Lexapro.  Anxiety issues.  Difficulty going in crowded store such as [employer].  Some stress at work.  BPH cyst of the prostate.  Having rectal itching.          Review of Systems   Constitutional: Negative.    HENT: Negative.     Eyes: Negative.    Respiratory: Negative.     Cardiovascular: Negative.    Gastrointestinal: Negative.         Recent rectal itching.   Endocrine: Negative.    Genitourinary: Negative.    Musculoskeletal: Negative.    Skin: Negative.    Neurological: Negative.    Hematological: Negative.    Psychiatric/Behavioral:  The patient is nervous/anxious.        Objective:      Physical Exam  Vitals reviewed.   Constitutional:       Appearance: Normal appearance. He is well-developed and normal weight.   HENT:      Head: Normocephalic and atraumatic.      Right Ear: Tympanic membrane normal.      Left Ear: Tympanic membrane normal.      Nose: Nose normal.      Mouth/Throat:      Mouth: Mucous membranes are moist.      Pharynx: No oropharyngeal exudate.   Eyes:      Conjunctiva/sclera: Conjunctivae normal.      Pupils: Pupils are equal, round, and reactive to light.   Neck:      Vascular: No carotid bruit.   Cardiovascular:      Rate and Rhythm: Normal rate and regular rhythm.      Pulses: Normal pulses.      Heart sounds: Normal heart sounds. No murmur heard.     No gallop.   Pulmonary:      Effort: Pulmonary effort is normal.      Breath sounds: Normal breath sounds.   Abdominal:       General: Bowel sounds are normal.      Palpations: Abdomen is soft. There is no mass.      Tenderness: There is no abdominal tenderness.   Genitourinary:     Comments: Rectal area is normal.  No rash.  No hemorrhoid evident.  Musculoskeletal:         General: Normal range of motion.      Cervical back: Normal range of motion.   Skin:     General: Skin is warm and dry.   Neurological:      General: No focal deficit present.      Mental Status: He is alert and oriented to person, place, and time.   Psychiatric:         Mood and Affect: Mood normal.         Behavior: Behavior normal.         Assessment:       1. TIA (transient ischemic attack)    2. Transient numbness of left face and left arm    3. Aspirin long-term use    4. Cancer of the skin, basal cell    5. Photodermatitis    6. Anxiety    7. Benign prostatic hyperplasia, unspecified whether lower urinary tract symptoms present    8. Rectal itching    9. Stress at work    10. Need for Tdap vaccination        Plan:       TIA (transient ischemic attack)    Transient numbness of left face and left arm  -     Comprehensive Metabolic Panel; Future; Expected date: 04/10/2025  -     Lipid Panel; Future; Expected date: 04/10/2025  -     Hemoglobin A1C; Future; Expected date: 04/10/2025  -     TSH; Future; Expected date: 04/10/2025    Aspirin long-term use  -     CBC Auto Differential; Future; Expected date: 04/10/2025    Cancer of the skin, basal cell    Photodermatitis    Anxiety  -     ALPRAZolam (XANAX) 0.5 MG tablet; Take 1 tablet (0.5 mg total) by mouth daily as needed for Anxiety.  Dispense: 30 tablet; Refill: 0    Benign prostatic hyperplasia, unspecified whether lower urinary tract symptoms present    Rectal itching    Stress at work    Need for Tdap vaccination  -     DIPH,PERTUSS(ACEL),TET VAC(PF)(ADULT)(ADACEL)(TDaP)    Other orders  -     busPIRone (BUSPAR) 5 MG Tab; Take 1 tablet (5 mg total) by mouth 2 (two) times daily.  Dispense: 60 tablet; Refill:  1    TD AP recommended.  Cologuard discussed.  Aspirin 81 daily due to history of TIA.  CBC CMP lipids A1c TSH ordered.    In addition to routine physical.  Rectal itching recently.  Difficulty with anxiety.  Can not go in crowded stores like [employer].  Stress at work very high.    Physical examination.  Vital signs noted.  Mood and affect normal.  Chest clear.  Heart regular rate rhythm.  No adenopathy.  Rectal externally there is no rash no skin tags no hemorrhoids.    Anxiety.  Rectal itching.      Plan try Proctocream-HC.  Do routine labs.  BuSpar 5 mg b.i.d. 60 with a refill.  He is allergic to Lexapro.  Xanax 0.5 p.r.n. only.

## 2025-04-12 PROBLEM — Z79.82 ASPIRIN LONG-TERM USE: Status: ACTIVE | Noted: 2025-04-12

## 2025-04-16 DIAGNOSIS — L29.9 ITCHING: Primary | ICD-10-CM

## 2025-04-16 RX ORDER — HYDROCORTISONE ACETATE, PRAMOXINE HCL 2.5; 1 G/100G; G/100G
CREAM TOPICAL 3 TIMES DAILY
Qty: 1 G | Refills: 1 | Status: SHIPPED | OUTPATIENT
Start: 2025-04-16

## 2025-04-16 RX ORDER — HYDROCORTISONE ACETATE, PRAMOXINE HCL 2.5; 1 G/100G; G/100G
CREAM TOPICAL 3 TIMES DAILY
Status: CANCELLED | OUTPATIENT
Start: 2025-04-16

## 2025-04-16 NOTE — TELEPHONE ENCOUNTER
----- Message from [employer] sent at 4/16/2025  9:00 AM CDT -----  Type: Needs Medical AdviceWho Called:  [facility] name and phone #:  [id number] [place] [id number] [facility] AT [facility] & [address] [address]Fjags: [phone] Fax: [phone] Call Back Number: [phone] Additional Information: betsy states  his cortisone cream  RX  was not sent to there pharmacy , please call to further assist thank you .

## 2025-04-16 NOTE — TELEPHONE ENCOUNTER
----- Message from [employer] sent at 4/16/2025  9:00 AM CDT -----  Type: Needs Medical AdviceWho Called:  [facility] name and phone #:  [id number] [place] [id number] [facility] AT [facility] & [address] [address]Cwopw: [phone] Fax: [phone] Call Back Number: [phone] Additional Information: betsy states  his cortisone cream  RX  was not sent to there pharmacy , please call to further assist thank you .

## 2025-04-16 NOTE — TELEPHONE ENCOUNTER
----- Message from [employer] sent at 4/16/2025  9:00 AM CDT -----  Type: Needs Medical AdviceWho Called:  betsy Pharmacy name and phone #:  [id number] [place] [id number] [facility] AT [facility] & [address] [address]Oablx: [phone] Fax: [phone] Call Back Number: [phone] Additional Information: betsy states  his cortisone cream  RX  was not sent to there pharmacy , please call to further assist thank you .

## 2025-05-06 ENCOUNTER — PATIENT MESSAGE (OUTPATIENT)
Dept: ADMINISTRATIVE | Facility: HOSPITAL | Age: 50
End: 2025-05-06
Payer: COMMERCIAL

## 2025-05-07 DIAGNOSIS — Z12.11 SCREENING FOR COLON CANCER: ICD-10-CM

## 2025-05-08 ENCOUNTER — LAB VISIT (OUTPATIENT)
Dept: LAB | Facility: HOSPITAL | Age: 50
End: 2025-05-08
Attending: FAMILY MEDICINE
Payer: COMMERCIAL

## 2025-05-08 DIAGNOSIS — R20.0 NUMBNESS: ICD-10-CM

## 2025-05-08 DIAGNOSIS — Z79.82 ASPIRIN LONG-TERM USE: ICD-10-CM

## 2025-05-08 LAB
ABSOLUTE EOSINOPHIL (SMH): 0.13 K/UL
ABSOLUTE MONOCYTE (SMH): 0.45 K/UL (ref 0.3–1)
ABSOLUTE NEUTROPHIL COUNT (SMH): 1.6 K/UL (ref 1.8–7.7)
ALBUMIN SERPL-MCNC: 4.7 G/DL (ref 3.5–5.2)
ALP SERPL-CCNC: 54 UNIT/L (ref 55–135)
ALT SERPL-CCNC: 18 UNIT/L (ref 10–44)
ANION GAP (SMH): 6 MMOL/L (ref 8–16)
AST SERPL-CCNC: 21 UNIT/L (ref 10–40)
BASOPHILS # BLD AUTO: 0.03 K/UL
BASOPHILS NFR BLD AUTO: 0.8 %
BILIRUB SERPL-MCNC: 1.1 MG/DL (ref 0.1–1)
BUN SERPL-MCNC: 18 MG/DL (ref 6–20)
CALCIUM SERPL-MCNC: 9.5 MG/DL (ref 8.7–10.5)
CHLORIDE SERPL-SCNC: 104 MMOL/L (ref 95–110)
CHOLEST SERPL-MCNC: 195 MG/DL (ref 120–199)
CHOLEST/HDLC SERPL: 3.4 {RATIO} (ref 2–5)
CO2 SERPL-SCNC: 29 MMOL/L (ref 23–29)
CREAT SERPL-MCNC: 1 MG/DL (ref 0.5–1.4)
EAG (SMH): 114 MG/DL (ref 68–131)
ERYTHROCYTE [DISTWIDTH] IN BLOOD BY AUTOMATED COUNT: 12.8 % (ref 11.5–14.5)
GFR SERPLBLD CREATININE-BSD FMLA CKD-EPI: >60 ML/MIN/1.73/M2
GLUCOSE SERPL-MCNC: 99 MG/DL (ref 70–110)
HBA1C MFR BLD: 5.6 % (ref 4.5–6.2)
HCT VFR BLD AUTO: 46.9 % (ref 40–54)
HDLC SERPL-MCNC: 58 MG/DL (ref 40–75)
HDLC SERPL: 29.7 % (ref 20–50)
HGB BLD-MCNC: 15.7 GM/DL (ref 14–18)
IMM GRANULOCYTES # BLD AUTO: 0.01 K/UL (ref 0–0.04)
IMM GRANULOCYTES NFR BLD AUTO: 0.3 % (ref 0–0.5)
LDLC SERPL CALC-MCNC: 116.6 MG/DL (ref 63–159)
LYMPHOCYTES # BLD AUTO: 1.66 K/UL (ref 1–4.8)
MCH RBC QN AUTO: 31 PG (ref 27–31)
MCHC RBC AUTO-ENTMCNC: 33.5 G/DL (ref 32–36)
MCV RBC AUTO: 93 FL (ref 82–98)
NONHDLC SERPL-MCNC: 137 MG/DL
NUCLEATED RBC (/100WBC) (SMH): 0 /100 WBC
PLATELET # BLD AUTO: 212 K/UL (ref 150–450)
PMV BLD AUTO: 9.5 FL (ref 9.2–12.9)
POTASSIUM SERPL-SCNC: 4.1 MMOL/L (ref 3.5–5.1)
PROT SERPL-MCNC: 7.3 GM/DL (ref 6–8.4)
RBC # BLD AUTO: 5.07 M/UL (ref 4.6–6.2)
RELATIVE EOSINOPHIL (SMH): 3.4 % (ref 0–8)
RELATIVE LYMPHOCYTE (SMH): 43.3 % (ref 18–48)
RELATIVE MONOCYTE (SMH): 11.7 % (ref 4–15)
RELATIVE NEUTROPHIL (SMH): 40.5 % (ref 38–73)
SODIUM SERPL-SCNC: 139 MMOL/L (ref 136–145)
TRIGL SERPL-MCNC: 102 MG/DL (ref 30–150)
TSH SERPL-ACNC: 2.63 UIU/ML (ref 0.34–5.6)
WBC # BLD AUTO: 3.83 K/UL (ref 3.9–12.7)

## 2025-05-08 PROCEDURE — 83036 HEMOGLOBIN GLYCOSYLATED A1C: CPT

## 2025-05-08 PROCEDURE — 84443 ASSAY THYROID STIM HORMONE: CPT

## 2025-05-08 PROCEDURE — 82465 ASSAY BLD/SERUM CHOLESTEROL: CPT

## 2025-05-08 PROCEDURE — 82040 ASSAY OF SERUM ALBUMIN: CPT

## 2025-05-08 PROCEDURE — 85025 COMPLETE CBC W/AUTO DIFF WBC: CPT

## 2025-05-08 PROCEDURE — 36415 COLL VENOUS BLD VENIPUNCTURE: CPT

## 2025-05-22 ENCOUNTER — OFFICE VISIT (OUTPATIENT)
Dept: FAMILY MEDICINE | Facility: CLINIC | Age: 50
End: 2025-05-22
Payer: COMMERCIAL

## 2025-05-22 VITALS
BODY MASS INDEX: 24.15 KG/M2 | DIASTOLIC BLOOD PRESSURE: 78 MMHG | OXYGEN SATURATION: 95 % | HEART RATE: 80 BPM | TEMPERATURE: 98 F | HEIGHT: 66 IN | SYSTOLIC BLOOD PRESSURE: 108 MMHG | WEIGHT: 150.25 LBS

## 2025-05-22 DIAGNOSIS — Z79.82 ASPIRIN LONG-TERM USE: ICD-10-CM

## 2025-05-22 DIAGNOSIS — G45.9 TIA (TRANSIENT ISCHEMIC ATTACK): Primary | ICD-10-CM

## 2025-05-22 DIAGNOSIS — F41.9 ANXIETY: ICD-10-CM

## 2025-05-22 DIAGNOSIS — Z12.5 PROSTATE CANCER SCREENING: ICD-10-CM

## 2025-05-22 PROCEDURE — 99999 PR PBB SHADOW E&M-EST. PATIENT-LVL IV: CPT | Mod: PBBFAC,,, | Performed by: FAMILY MEDICINE

## 2025-05-22 PROCEDURE — 99214 OFFICE O/P EST MOD 30 MIN: CPT | Mod: S$GLB,,, | Performed by: FAMILY MEDICINE

## 2025-05-22 RX ORDER — PRAVASTATIN SODIUM 20 MG/1
20 TABLET ORAL DAILY
COMMUNITY
End: 2025-05-22 | Stop reason: SDUPTHER

## 2025-05-22 RX ORDER — BUSPIRONE HYDROCHLORIDE 10 MG/1
10 TABLET ORAL 2 TIMES DAILY
Qty: 60 TABLET | Refills: 2 | Status: SHIPPED | OUTPATIENT
Start: 2025-05-22

## 2025-05-22 RX ORDER — PRAVASTATIN SODIUM 20 MG/1
20 TABLET ORAL DAILY
Qty: 90 TABLET | Refills: 1 | Status: SHIPPED | OUTPATIENT
Start: 2025-05-22

## 2025-05-22 NOTE — PROGRESS NOTES
SCRIBE #1 NOTE: I, Jean Roland, am scribing for, and in the presence of,  Benito Villasenor III, MD. I have scribed the entire note.     Subjective:       Patient ID: Dionisio Devlin is a 49 y.o. male.    Chief Complaint: Follow-up (6 weeks   Lab results)    Mr. Devlin is here for a follow up with his last appointment being here on April 10, 2025. Reports rash is okay now. BMI of 24.25. Cardiovascular good. No chest pain. No palpitations. Blood pressure is 108/78. TIA. No more stroke-like episodes. Reports his left arm when numb along with half of his face. He also tasted metal in his mouth, but it only lasted for a second. Using aspirin. Anxiety. On Buspar 5mg 2x a day. He could see less anxiety after 1 week with the medication. He can tell a difference. The only side effect is that he felt funny when he did not eat with it. He did use Xanax one time, and states a whole pill knocked him out. PSA was 0.50 in July 2024. Cholesterol is 195. HDL is 58. LDL is 117. A1C is 5.6. Blood glucose is 99. TSH is 2.631. CBC and CMP are okay. [place] is now at his house.     Review of Systems   Constitutional:  Negative for chills and fever.   HENT:  Negative for congestion and sore throat.    Eyes:  Negative for pain and visual disturbance.   Respiratory:  Negative for chest tightness and shortness of breath.    Cardiovascular:  Negative for chest pain.   Gastrointestinal:  Negative for nausea.   Endocrine: Negative for polydipsia and polyuria.   Genitourinary:  Negative for dysuria and flank pain.   Musculoskeletal:  Negative for back pain, neck pain and neck stiffness.   Skin:  Negative for rash.   Allergic/Immunologic: Negative for immunocompromised state.   Neurological:  Negative for dizziness and weakness.   Hematological:  Does not bruise/bleed easily.   Psychiatric/Behavioral:  Negative for agitation and behavioral problems.    All other systems reviewed and are negative.      Objective:      Physical examination: Vital  signs noted. No acute distress. No carotid bruit. Regular heart rate and rhythm. Lungs clear to auscultation bilaterally. Abdomen bowel sounds positive soft and nontender. Extremities without edema. 2+ pedal pulses.      Assessment:       1. TIA (transient ischemic attack)    2. Aspirin long-term use    3. Anxiety    4. BMI 24.0-24.9, adult    5. Prostate cancer screening        Plan:       TIA (transient ischemic attack)  -     Comprehensive Metabolic Panel; Future; Expected date: 08/22/2025  -     Lipid Panel; Future; Expected date: 08/22/2025    Aspirin long-term use    Anxiety    BMI 24.0-24.9, adult    Prostate cancer screening  -     PSA, Screening; Future; Expected date: 08/22/2025    No further TIAs.  Continue his aspirin.  Increase the BuSpar to 10 mg b.i.d. 60 with 2 refills.  Continue use the Xanax PRN he uses this very rarely.  Pravastatin 20 mg daily 90 with a refill.  Follow-up in 3 months with CMP and lipids.  PSA will be due in July.  Do these labs at the same time.  All care gaps addressed or discussed.     I, Benito Villasenor III, MD, personally performed the services described in this documentation. All medical record entries made by the scribe were at my direction and in my presence. I have reviewed the chart and agree that the record reflects my personal performance and is accurate and complete.

## 2025-06-10 ENCOUNTER — RESULTS FOLLOW-UP (OUTPATIENT)
Dept: FAMILY MEDICINE | Facility: CLINIC | Age: 50
End: 2025-06-10

## 2025-08-04 ENCOUNTER — LAB VISIT (OUTPATIENT)
Dept: LAB | Facility: HOSPITAL | Age: 50
End: 2025-08-04
Attending: SPECIALIST
Payer: COMMERCIAL

## 2025-08-04 DIAGNOSIS — Z12.5 SPECIAL SCREENING FOR MALIGNANT NEOPLASM OF PROSTATE: Primary | ICD-10-CM

## 2025-08-04 LAB — PSA SERPL-MCNC: 0.59 NG/ML (ref ?–4)

## 2025-08-04 PROCEDURE — 84153 ASSAY OF PSA TOTAL: CPT

## 2025-08-04 PROCEDURE — 36415 COLL VENOUS BLD VENIPUNCTURE: CPT

## 2025-08-18 ENCOUNTER — LAB VISIT (OUTPATIENT)
Dept: LAB | Facility: HOSPITAL | Age: 50
End: 2025-08-18
Attending: FAMILY MEDICINE
Payer: COMMERCIAL

## 2025-08-18 DIAGNOSIS — Z12.5 PROSTATE CANCER SCREENING: ICD-10-CM

## 2025-08-18 DIAGNOSIS — G45.9 TIA (TRANSIENT ISCHEMIC ATTACK): ICD-10-CM

## 2025-08-18 LAB
ALBUMIN SERPL-MCNC: 4.5 G/DL (ref 3.5–5.2)
ALP SERPL-CCNC: 54 UNIT/L (ref 55–135)
ALT SERPL-CCNC: 17 UNIT/L (ref 10–44)
ANION GAP (SMH): 7 MMOL/L (ref 8–16)
AST SERPL-CCNC: 23 UNIT/L (ref 10–40)
BILIRUB SERPL-MCNC: 0.6 MG/DL (ref 0.1–1)
BUN SERPL-MCNC: 21 MG/DL (ref 6–20)
CALCIUM SERPL-MCNC: 9.3 MG/DL (ref 8.7–10.5)
CHLORIDE SERPL-SCNC: 104 MMOL/L (ref 95–110)
CHOLEST SERPL-MCNC: 183 MG/DL (ref 120–199)
CHOLEST/HDLC SERPL: 3.6 {RATIO} (ref 2–5)
CO2 SERPL-SCNC: 29 MMOL/L (ref 23–29)
CREAT SERPL-MCNC: 1.1 MG/DL (ref 0.5–1.4)
GFR SERPLBLD CREATININE-BSD FMLA CKD-EPI: >60 ML/MIN/1.73/M2
GLUCOSE SERPL-MCNC: 98 MG/DL (ref 70–110)
HDLC SERPL-MCNC: 51 MG/DL (ref 40–75)
HDLC SERPL: 27.9 % (ref 20–50)
LDLC SERPL CALC-MCNC: 101.8 MG/DL (ref 63–159)
NONHDLC SERPL-MCNC: 132 MG/DL
POTASSIUM SERPL-SCNC: 4.1 MMOL/L (ref 3.5–5.1)
PROT SERPL-MCNC: 6.8 GM/DL (ref 6–8.4)
SODIUM SERPL-SCNC: 140 MMOL/L (ref 136–145)
TRIGL SERPL-MCNC: 151 MG/DL (ref 30–150)

## 2025-08-18 PROCEDURE — 80053 COMPREHEN METABOLIC PANEL: CPT

## 2025-08-18 PROCEDURE — 36415 COLL VENOUS BLD VENIPUNCTURE: CPT

## 2025-08-18 PROCEDURE — 80061 LIPID PANEL: CPT

## 2025-08-22 ENCOUNTER — OFFICE VISIT (OUTPATIENT)
Dept: FAMILY MEDICINE | Facility: CLINIC | Age: 50
End: 2025-08-22
Payer: COMMERCIAL

## 2025-08-22 VITALS
BODY MASS INDEX: 24.18 KG/M2 | TEMPERATURE: 98 F | HEART RATE: 71 BPM | DIASTOLIC BLOOD PRESSURE: 84 MMHG | HEIGHT: 66 IN | OXYGEN SATURATION: 97 % | SYSTOLIC BLOOD PRESSURE: 110 MMHG | WEIGHT: 150.44 LBS

## 2025-08-22 DIAGNOSIS — Z79.82 ASPIRIN LONG-TERM USE: ICD-10-CM

## 2025-08-22 DIAGNOSIS — G43.909 MIGRAINE WITHOUT STATUS MIGRAINOSUS, NOT INTRACTABLE, UNSPECIFIED MIGRAINE TYPE: ICD-10-CM

## 2025-08-22 DIAGNOSIS — F41.9 ANXIETY: ICD-10-CM

## 2025-08-22 DIAGNOSIS — G45.9 TIA (TRANSIENT ISCHEMIC ATTACK): Primary | ICD-10-CM

## 2025-08-22 PROCEDURE — 99999 PR PBB SHADOW E&M-EST. PATIENT-LVL III: CPT | Mod: PBBFAC,,, | Performed by: FAMILY MEDICINE

## 2025-08-22 RX ORDER — SERTRALINE HYDROCHLORIDE 25 MG/1
25 TABLET, FILM COATED ORAL DAILY
Qty: 30 TABLET | Refills: 2 | Status: SHIPPED | OUTPATIENT
Start: 2025-08-22

## 2025-08-22 RX ORDER — SERTRALINE HYDROCHLORIDE 25 MG/1
25 TABLET, FILM COATED ORAL DAILY
COMMUNITY
End: 2025-08-22 | Stop reason: SDUPTHER